# Patient Record
Sex: FEMALE | Race: WHITE | NOT HISPANIC OR LATINO | Employment: FULL TIME | URBAN - METROPOLITAN AREA
[De-identification: names, ages, dates, MRNs, and addresses within clinical notes are randomized per-mention and may not be internally consistent; named-entity substitution may affect disease eponyms.]

---

## 2023-04-25 DIAGNOSIS — I10 HYPERTENSION, UNSPECIFIED TYPE: ICD-10-CM

## 2023-04-25 RX ORDER — LOSARTAN POTASSIUM AND HYDROCHLOROTHIAZIDE 12.5; 5 MG/1; MG/1
TABLET ORAL
Qty: 90 TABLET | Refills: 0 | Status: SHIPPED | OUTPATIENT
Start: 2023-04-25 | End: 2023-08-31 | Stop reason: SDUPTHER

## 2023-06-13 ENCOUNTER — OFFICE VISIT (OUTPATIENT)
Dept: PRIMARY CARE | Facility: CLINIC | Age: 63
End: 2023-06-13
Payer: COMMERCIAL

## 2023-06-13 VITALS
TEMPERATURE: 97.8 F | WEIGHT: 179 LBS | RESPIRATION RATE: 18 BRPM | OXYGEN SATURATION: 98 % | SYSTOLIC BLOOD PRESSURE: 132 MMHG | DIASTOLIC BLOOD PRESSURE: 84 MMHG | HEART RATE: 78 BPM | BODY MASS INDEX: 29.79 KG/M2

## 2023-06-13 DIAGNOSIS — R39.9 URINARY TRACT INFECTION SYMPTOMS: Primary | ICD-10-CM

## 2023-06-13 LAB
POC APPEARANCE, URINE: CLEAR
POC BILIRUBIN, URINE: NEGATIVE
POC BLOOD, URINE: ABNORMAL
POC COLOR, URINE: YELLOW
POC GLUCOSE, URINE: NEGATIVE MG/DL
POC KETONES, URINE: NEGATIVE MG/DL
POC LEUKOCYTES, URINE: NEGATIVE
POC NITRITE,URINE: NEGATIVE
POC PH, URINE: 6.5 PH
POC PROTEIN, URINE: ABNORMAL MG/DL
POC SPECIFIC GRAVITY, URINE: 1.01
POC UROBILINOGEN, URINE: 0.2 EU/DL

## 2023-06-13 PROCEDURE — 87186 SC STD MICRODIL/AGAR DIL: CPT

## 2023-06-13 PROCEDURE — 1036F TOBACCO NON-USER: CPT | Performed by: NURSE PRACTITIONER

## 2023-06-13 PROCEDURE — 87086 URINE CULTURE/COLONY COUNT: CPT

## 2023-06-13 PROCEDURE — 81002 URINALYSIS NONAUTO W/O SCOPE: CPT | Performed by: NURSE PRACTITIONER

## 2023-06-13 PROCEDURE — 99214 OFFICE O/P EST MOD 30 MIN: CPT | Performed by: NURSE PRACTITIONER

## 2023-06-13 PROCEDURE — 87077 CULTURE AEROBIC IDENTIFY: CPT

## 2023-06-13 RX ORDER — NITROFURANTOIN 25; 75 MG/1; MG/1
100 CAPSULE ORAL 2 TIMES DAILY
Qty: 10 CAPSULE | Refills: 0 | Status: SHIPPED | OUTPATIENT
Start: 2023-06-13 | End: 2023-06-18

## 2023-06-13 ASSESSMENT — ENCOUNTER SYMPTOMS
PSYCHIATRIC NEGATIVE: 1
CARDIOVASCULAR NEGATIVE: 1
RESPIRATORY NEGATIVE: 1
GASTROINTESTINAL NEGATIVE: 1
HEMATURIA: 0
FLANK PAIN: 0
CONSTITUTIONAL NEGATIVE: 1
FREQUENCY: 1
NEUROLOGICAL NEGATIVE: 1
DYSURIA: 1

## 2023-06-13 NOTE — PROGRESS NOTES
Subjective   Patient ID: Shilpi Barraza is a 62 y.o. female who presents for UTI.    Symptoms started on Sunday with urinary frequency, burning with urination, urgency, hesitancy. No blood in the urine. No nausea or vomiting. Pt took AZO for the past few days and it has been helping. Feeling well otherwise.     Review of Systems   Constitutional: Negative.    HENT: Negative.     Respiratory: Negative.     Cardiovascular: Negative.    Gastrointestinal: Negative.    Genitourinary:  Positive for dysuria, frequency and urgency. Negative for flank pain and hematuria.   Neurological: Negative.    Psychiatric/Behavioral: Negative.         Objective   /84   Pulse 78   Temp 36.6 °C (97.8 °F) (Temporal)   Resp 18   Wt 81.2 kg (179 lb)   SpO2 98%   BMI 29.79 kg/m²     Physical Exam  Vitals reviewed.   Constitutional:       Appearance: Normal appearance. She is not ill-appearing or toxic-appearing.   HENT:      Head: Atraumatic.   Eyes:      Conjunctiva/sclera: Conjunctivae normal.   Cardiovascular:      Rate and Rhythm: Normal rate and regular rhythm.      Heart sounds: Normal heart sounds. No murmur heard.  Pulmonary:      Effort: Pulmonary effort is normal.      Breath sounds: Normal breath sounds. No wheezing.   Abdominal:      General: Bowel sounds are normal.      Palpations: Abdomen is soft.      Tenderness: There is no right CVA tenderness or left CVA tenderness.      Comments: Slight suprapubic pressure to palpation   Skin:     General: Skin is warm and dry.   Neurological:      General: No focal deficit present.      Mental Status: She is alert.   Psychiatric:         Mood and Affect: Mood normal.         Behavior: Behavior normal.     Assessment/Plan   Problem List Items Addressed This Visit    None  Visit Diagnoses       Urinary tract infection symptoms    -  Primary    Relevant Medications    nitrofurantoin, macrocrystal-monohydrate, (Macrobid) 100 mg capsule    Other Relevant Orders    Urine Culture     POCT UA (nonautomated) manually resulted (Completed)        will start patient on macrobid at this time. will send urine out for culture. patient advised to call the office if symptoms persist in the next 2-3 days despite the use of the medication. patient advised to go to the ER for any fevers, chills, abdominal pain, nausea, vomiting or new/concerning symptoms; she agreed. will call pt when urine culture results become available.

## 2023-06-16 ENCOUNTER — TELEPHONE (OUTPATIENT)
Dept: PRIMARY CARE | Facility: CLINIC | Age: 63
End: 2023-06-16
Payer: COMMERCIAL

## 2023-06-16 DIAGNOSIS — R39.9 URINARY TRACT INFECTION SYMPTOMS: Primary | ICD-10-CM

## 2023-06-16 LAB — URINE CULTURE: ABNORMAL

## 2023-06-16 RX ORDER — AMOXICILLIN AND CLAVULANATE POTASSIUM 875; 125 MG/1; MG/1
875 TABLET, FILM COATED ORAL 2 TIMES DAILY
Qty: 14 TABLET | Refills: 0 | Status: SHIPPED | OUTPATIENT
Start: 2023-06-16 | End: 2023-06-23

## 2023-06-16 NOTE — TELEPHONE ENCOUNTER
Patient says that she is still having UTI symptoms and discomfort. Will switch the macrobid to augmentin. Patient to stop the macrobid and take augmentin instead. Pt advised to follow up in 2-3 days if no better despite the use of the medications.

## 2023-06-16 NOTE — TELEPHONE ENCOUNTER
Spoke to patient and she says that she is still having some UTI symptoms and is feeling nauseated. Advised that I only received preliminary results for the urine culture and I should get the sensitivity report later this afternoon. I will call pt when results become available; she is in agreement     ----- Message from kP Agudelo CMA sent at 6/16/2023  9:14 AM EDT -----  Regarding: FW: UTI apt on June 13  Contact: [phone]    ----- Message -----  From: Shilpi Barraza  Sent: 6/15/2023   5:04 PM EDT  To: [facility] [place] [id number] Clinical Support Staff  Subject: UTI apt on June 13                               I came in for an apt on June 13, Not sure how the test results were but I'm still feeling the burning and I think the medication is making me feel sick.  Do you think I may need another medication or should I give it another day or so.

## 2023-08-31 ENCOUNTER — TELEPHONE (OUTPATIENT)
Dept: PRIMARY CARE | Facility: CLINIC | Age: 63
End: 2023-08-31
Payer: COMMERCIAL

## 2023-08-31 DIAGNOSIS — I10 HYPERTENSION, UNSPECIFIED TYPE: ICD-10-CM

## 2023-08-31 RX ORDER — LOSARTAN POTASSIUM AND HYDROCHLOROTHIAZIDE 12.5; 5 MG/1; MG/1
1 TABLET ORAL DAILY
Qty: 90 TABLET | Refills: 0 | Status: SHIPPED | OUTPATIENT
Start: 2023-08-31 | End: 2023-09-25 | Stop reason: SDUPTHER

## 2023-09-20 PROBLEM — I10 HYPERTENSION: Status: ACTIVE | Noted: 2023-09-20

## 2023-09-20 PROBLEM — N81.10 FEMALE BLADDER PROLAPSE: Status: ACTIVE | Noted: 2023-09-20

## 2023-09-20 PROBLEM — E78.5 HYPERLIPIDEMIA: Status: ACTIVE | Noted: 2023-09-20

## 2023-09-20 PROBLEM — R10.13 DYSPEPSIA: Status: ACTIVE | Noted: 2023-09-20

## 2023-09-20 PROBLEM — M16.9 DEGENERATIVE JOINT DISEASE (DJD) OF HIP: Status: ACTIVE | Noted: 2023-09-20

## 2023-09-20 PROBLEM — R19.5 POSITIVE COLORECTAL CANCER SCREENING USING COLOGUARD TEST: Status: ACTIVE | Noted: 2023-09-20

## 2023-09-20 PROBLEM — M16.11 ARTHRITIS OF RIGHT HIP: Status: ACTIVE | Noted: 2023-09-20

## 2023-09-20 PROBLEM — E79.0 ELEVATED URIC ACID IN BLOOD: Status: ACTIVE | Noted: 2023-09-20

## 2023-09-20 PROBLEM — M25.561 KNEE PAIN, RIGHT: Status: ACTIVE | Noted: 2023-09-20

## 2023-09-20 PROBLEM — R73.9 HYPERGLYCEMIA: Status: ACTIVE | Noted: 2023-09-20

## 2023-09-20 PROBLEM — B00.1 HERPES LABIALIS: Status: ACTIVE | Noted: 2023-09-20

## 2023-09-20 PROBLEM — R19.8: Status: ACTIVE | Noted: 2023-09-20

## 2023-09-20 PROBLEM — M62.89 PELVIC FLOOR DYSFUNCTION IN FEMALE: Status: ACTIVE | Noted: 2023-09-20

## 2023-09-20 RX ORDER — ATORVASTATIN CALCIUM 40 MG/1
1 TABLET, FILM COATED ORAL NIGHTLY
COMMUNITY
Start: 2018-12-01 | End: 2023-09-25 | Stop reason: SDUPTHER

## 2023-09-20 RX ORDER — VALACYCLOVIR HYDROCHLORIDE 1 G/1
TABLET, FILM COATED ORAL 2 TIMES DAILY
COMMUNITY
Start: 2016-10-27 | End: 2023-09-25 | Stop reason: SDUPTHER

## 2023-09-20 RX ORDER — OMEPRAZOLE 40 MG/1
1 CAPSULE, DELAYED RELEASE ORAL DAILY
COMMUNITY
Start: 2022-05-09 | End: 2023-09-25 | Stop reason: SDUPTHER

## 2023-09-25 ENCOUNTER — OFFICE VISIT (OUTPATIENT)
Dept: PRIMARY CARE | Facility: CLINIC | Age: 63
End: 2023-09-25
Payer: COMMERCIAL

## 2023-09-25 VITALS
DIASTOLIC BLOOD PRESSURE: 80 MMHG | SYSTOLIC BLOOD PRESSURE: 118 MMHG | HEART RATE: 83 BPM | WEIGHT: 179 LBS | HEIGHT: 65 IN | BODY MASS INDEX: 29.82 KG/M2 | OXYGEN SATURATION: 98 % | RESPIRATION RATE: 18 BRPM | TEMPERATURE: 97.1 F

## 2023-09-25 DIAGNOSIS — R10.13 DYSPEPSIA: Primary | ICD-10-CM

## 2023-09-25 DIAGNOSIS — B00.1 COLD SORE: ICD-10-CM

## 2023-09-25 DIAGNOSIS — I10 HYPERTENSION, UNSPECIFIED TYPE: ICD-10-CM

## 2023-09-25 DIAGNOSIS — E78.5 HYPERLIPIDEMIA, UNSPECIFIED HYPERLIPIDEMIA TYPE: ICD-10-CM

## 2023-09-25 DIAGNOSIS — K21.9 GASTROESOPHAGEAL REFLUX DISEASE, UNSPECIFIED WHETHER ESOPHAGITIS PRESENT: ICD-10-CM

## 2023-09-25 DIAGNOSIS — R73.03 PREDIABETES: ICD-10-CM

## 2023-09-25 PROCEDURE — 1036F TOBACCO NON-USER: CPT | Performed by: PHYSICIAN ASSISTANT

## 2023-09-25 PROCEDURE — 99214 OFFICE O/P EST MOD 30 MIN: CPT | Performed by: PHYSICIAN ASSISTANT

## 2023-09-25 PROCEDURE — 3079F DIAST BP 80-89 MM HG: CPT | Performed by: PHYSICIAN ASSISTANT

## 2023-09-25 PROCEDURE — 3074F SYST BP LT 130 MM HG: CPT | Performed by: PHYSICIAN ASSISTANT

## 2023-09-25 RX ORDER — LOSARTAN POTASSIUM AND HYDROCHLOROTHIAZIDE 12.5; 5 MG/1; MG/1
1 TABLET ORAL DAILY
Qty: 90 TABLET | Refills: 1 | Status: SHIPPED | OUTPATIENT
Start: 2023-09-25 | End: 2023-09-25

## 2023-09-25 RX ORDER — OMEPRAZOLE 40 MG/1
40 CAPSULE, DELAYED RELEASE ORAL DAILY
Qty: 90 CAPSULE | Refills: 1 | Status: SHIPPED | OUTPATIENT
Start: 2023-09-25 | End: 2023-09-25

## 2023-09-25 RX ORDER — ATORVASTATIN CALCIUM 40 MG/1
40 TABLET, FILM COATED ORAL NIGHTLY
Qty: 90 TABLET | Refills: 1 | Status: SHIPPED | OUTPATIENT
Start: 2023-09-25 | End: 2023-09-25

## 2023-09-25 RX ORDER — VALACYCLOVIR HYDROCHLORIDE 1 G/1
1000 TABLET, FILM COATED ORAL DAILY
Qty: 90 TABLET | Refills: 1 | Status: SHIPPED | OUTPATIENT
Start: 2023-09-25 | End: 2023-09-25

## 2023-09-25 NOTE — ASSESSMENT & PLAN NOTE
- Last lipid 2/16/23 , ratio 5.4  - Pt is currently on atorvastatin 40 mg   - Diet is poor right now - encouraged she improve this - decrease saturated fats  - New fasting lipid panel ordered today

## 2023-09-25 NOTE — PROGRESS NOTES
"Subjective   Patient ID: Shilpi Barraza is a 63 y.o. female who presents for Med Refill (Dr Zacarias pt here today for med refills; last seen 2/2023. ).    HPI     Hyperlipidemia:  - Last lipid 2/16/23 , ratio 5.4  - Pt is currently on atorvastatin 40 mg   - Diet: not eating as healthy lately (mom going through tx for mouth cancer and so pt has been working full time and then checking on mom - eating on the run)     Prediabetes  - Labs 2/16/23 showed A1c 5.7%  - Not on any meds for this   - Low sweets/ low carb diet? Not right now     HTN   - Well controleld on losartan-hydrochlorothiazide 50-12.5 mg     GERD  - Takes omeprazole 40 mg about twice a week      Objective   /80   Pulse 83   Temp 36.2 °C (97.1 °F)   Resp 18   Ht 1.651 m (5' 5\")   Wt 81.2 kg (179 lb)   SpO2 98%   BMI 29.79 kg/m²     Physical Exam  Constitutional:       Appearance: Normal appearance.   Cardiovascular:      Rate and Rhythm: Normal rate and regular rhythm.      Pulses: Normal pulses.      Heart sounds: Normal heart sounds. No murmur heard.  Pulmonary:      Effort: Pulmonary effort is normal.      Breath sounds: Normal breath sounds.   Neurological:      Mental Status: She is alert.   Psychiatric:         Mood and Affect: Mood and affect normal.         Assessment/Plan   Problem List Items Addressed This Visit       Dyspepsia - Primary    Relevant Medications    omeprazole (PriLOSEC) 40 mg DR capsule    Hyperlipidemia     - Last lipid 2/16/23 , ratio 5.4  - Pt is currently on atorvastatin 40 mg   - Diet is poor right now - encouraged she improve this - decrease saturated fats  - New fasting lipid panel ordered today          Relevant Medications    atorvastatin (Lipitor) 40 mg tablet    Other Relevant Orders    Magnesium    Lipid Panel    Hypertension     - BP well controlled on current regimen losartan-hydrochlorothiazide 50-12.5 mg   - BP today 118/80 mmHg   - Continue current tx plan         Relevant " Medications    losartan-hydrochlorothiazide (Hyzaar) 50-12.5 mg tablet    Other Relevant Orders    Magnesium    Vitamin B12    Basic Metabolic Panel    Prediabetes     - Most recent A1c was 5.7%   - Encouraged low sweets/ low carb diet          Relevant Orders    Hemoglobin A1C     Other Visit Diagnoses       Gastroesophageal reflux disease, unspecified whether esophagitis present        Relevant Medications    omeprazole (PriLOSEC) 40 mg DR capsule    Cold sore        Relevant Medications    valACYclovir (Valtrex) 1 gram tablet

## 2023-09-25 NOTE — ASSESSMENT & PLAN NOTE
- BP well controlled on current regimen losartan-hydrochlorothiazide 50-12.5 mg   - BP today 118/80 mmHg   - Continue current tx plan

## 2023-12-18 PROCEDURE — RXMED WILLOW AMBULATORY MEDICATION CHARGE

## 2023-12-21 ENCOUNTER — PHARMACY VISIT (OUTPATIENT)
Dept: PHARMACY | Facility: CLINIC | Age: 63
End: 2023-12-21
Payer: COMMERCIAL

## 2023-12-27 ENCOUNTER — CLINICAL SUPPORT (OUTPATIENT)
Dept: ORTHOPEDIC SURGERY | Facility: CLINIC | Age: 63
End: 2023-12-27
Payer: COMMERCIAL

## 2023-12-27 ENCOUNTER — OFFICE VISIT (OUTPATIENT)
Dept: ORTHOPEDIC SURGERY | Facility: CLINIC | Age: 63
End: 2023-12-27
Payer: COMMERCIAL

## 2023-12-27 DIAGNOSIS — M16.11 ARTHRITIS OF RIGHT HIP: ICD-10-CM

## 2023-12-27 PROCEDURE — 1036F TOBACCO NON-USER: CPT | Performed by: FAMILY MEDICINE

## 2023-12-27 PROCEDURE — 20611 DRAIN/INJ JOINT/BURSA W/US: CPT | Performed by: FAMILY MEDICINE

## 2023-12-27 PROCEDURE — 99213 OFFICE O/P EST LOW 20 MIN: CPT | Performed by: FAMILY MEDICINE

## 2023-12-27 RX ADMIN — LIDOCAINE HYDROCHLORIDE 2 ML: 10 INJECTION, SOLUTION EPIDURAL; INFILTRATION; INTRACAUDAL; PERINEURAL at 15:03

## 2023-12-27 RX ADMIN — METHYLPREDNISOLONE ACETATE 80 MG: 40 INJECTION, SUSPENSION INTRA-ARTICULAR; INTRALESIONAL; INTRAMUSCULAR; SOFT TISSUE at 15:03

## 2023-12-27 ASSESSMENT — PAIN DESCRIPTION - DESCRIPTORS: DESCRIPTORS: SHARP

## 2023-12-27 ASSESSMENT — PAIN - FUNCTIONAL ASSESSMENT: PAIN_FUNCTIONAL_ASSESSMENT: 0-10

## 2023-12-27 ASSESSMENT — PAIN SCALES - GENERAL: PAINLEVEL_OUTOF10: 4

## 2023-12-27 NOTE — PROGRESS NOTES
FUV R hip CSI(4/17/23-CSI)  Chief Complaint: Pain of the Right Hip  History of Present Illness:  Encounter date: 12/27/2023  Shilpi Barraza is a 63 y.o. female presents with bilateral hip pain, right worse than left since December 2021. She states she initially fell on some stairs in November 2021 and then slipped getting out of the bathtub about a month later. When asked the point where the pain is, she points to the groin. She is having trouble going from sitting to standing. Walking is a little bit difficult as well. Nothing really helps the pain and she rates it as a 6 out of 10 at its worst. 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 She denies any radiating type pain.     On 4/17/23, she returned saying that the R hip CSI worked for about 5-6mo but she is starting to get pain again. She denies new trauma or injuries. She is requesting a repeat injection. No other complaints here today.     12/27/23  She returns today with recurrence of her right hip pain.  Her corticosteroid injection lasted for about 4 months but she is now having pain in the anterior groin.  She denies any new injury or trauma to the area.  She is requesting repeat injection.    Problem List  Patient Active Problem List    Diagnosis Date Noted    Prediabetes 09/25/2023    Arthritis of right hip 09/20/2023    Degenerative joint disease (DJD) of hip 09/20/2023    Dyspepsia 09/20/2023    Elevated uric acid in blood 09/20/2023    Herpes labialis 09/20/2023    Hyperglycemia 09/20/2023    Hyperlipidemia 09/20/2023    Hypertension 09/20/2023    Knee pain, right 09/20/2023    Nonspecific hepatocellular changes 09/20/2023    Pelvic floor dysfunction in female 09/20/2023    Positive colorectal cancer screening using Cologuard test 09/20/2023    Female bladder prolapse 09/20/2023       Past Medical History  Past Medical History:   Diagnosis Date    Encounter for  gynecological examination (general) (routine) without abnormal findings     Pap test, as part of routine gynecological examination    Other conditions influencing health status     Mammogram normal       Past Surgical History  Past Surgical History:   Procedure Laterality Date    COLONOSCOPY      Colonoscopy    DILATION AND CURETTAGE OF UTERUS  04/13/2015    Dilation And Curettage    GALLBLADDER SURGERY  04/14/2015    Gallbladder Surgery    HYSTEROSCOPY  04/13/2015    Hysteroscopy With Endometrial Ablation    MOUTH SURGERY      Oral Surgery       Social History  Social History     Socioeconomic History    Marital status:      Spouse name: Not on file    Number of children: Not on file    Years of education: Not on file    Highest education level: Not on file   Occupational History    Not on file   Tobacco Use    Smoking status: Never    Smokeless tobacco: Never   Substance and Sexual Activity    Alcohol use: Never    Drug use: Never    Sexual activity: Not on file   Other Topics Concern    Not on file   Social History Narrative    Not on file     Social Determinants of Health     Financial Resource Strain: Not on file   Food Insecurity: Not on file   Transportation Needs: Not on file   Physical Activity: Not on file   Stress: Not on file   Social Connections: Not on file   Intimate Partner Violence: Not on file   Housing Stability: Not on file       Allergies  No Known Allergies    Medications  Current Outpatient Medications   Medication Sig Dispense Refill    atorvastatin (Lipitor) 40 mg tablet TAKE 1 TABLET BY MOUTH AT BEDTIME 90 tablet 1    losartan-hydrochlorothiazide (Hyzaar) 50-12.5 mg tablet TAKE 1 TABLET BY MOUTH ONCE DAILY AS DIRECTED 90 tablet 1    omeprazole (PriLOSEC) 40 mg DR capsule TAKE 1 CAPSULE BY MOUTH ONCE DAILY 90 capsule 1    valACYclovir (Valtrex) 1 gram tablet TAKE 1 TABLET BY MOUTH ONCE DAILY 90 tablet 1     No current facility-administered medications for this visit.       Physical  Exam:  The right hip and pelvis are without obvious signs of acute bony deformity, swelling, erythema, ecchymosis or instability. Active and passive range of motion are limited and painful. Log roll is positive. Straight leg raise test is negative.  FADIR is positive. ANTONIA is negative.  Crossover is negative. Hip strength is weak as compared to the opposite hip. The opposite hip is otherwise nontender and stable.    Imaging:  Right hip xrays show moderate to severe arthritis of the right hip with osteophyte formation.    The studies were reviewed with Dr. Montelongo personally in the office today.    Ultrasound-guided right femoral acetabular joint corticosteroid injection. Risk, benefits, and alternatives were explained to the patient, verbal and written consent was obtained. The was placed in a supine position. The right hip was identified. The curved ultrasound transducer was placed over the hip joint. The femoral vessels were identified, the probe was slid laterally and turned on its long axis to reveal the hip joint and capsular recess. The area of injection was cleaned with a chlorhexidine prep and was draped sterilely. Next, a 20-gauge spinal needle was then placed into the joint recess under ultrasound guidance. The needle tip was visualized throughout. 2 mL of Depo-Medrol and 2 mL of 1% lidocaine was injected into the joint space. Distention of the joint space was visualized on ultrasound. The entire contents of the syringe was injected, the fluid flowed freely without obstruction. The needle was then removed, the areas cleaned with an alcohol prep, and a sterile Band-Aid was placed. Ultrasound images were obtained throughout.    L Inj/Asp: R hip joint on 12/27/2023 3:03 PM  Indications: pain  Details: 20 G needle, ultrasound-guided anterior approach  Medications: 2 mL lidocaine PF 10 mg/mL (1 %); 80 mg methylPREDNISolone acetate 40 mg/mL  Outcome: tolerated well, no immediate complications  Procedure,  treatment alternatives, risks and benefits explained, specific risks discussed. Consent was given by the patient. Patient was prepped and draped in the usual sterile fashion.             Problem List Items Addressed This Visit             ICD-10-CM    Arthritis of right hip M16.11    Relevant Orders    XR hip right with pelvis when performed 2 or 3 views    Point of Care Ultrasound (Completed)     Patient Discussion/Summary  Bilateral hip osteoarthritis, right worse than left status post ultrasound-guided right hip intra-articular hip injection. She was educated on the signs and symptoms of local reaction and infection and should call the office if she experiences any of these. She should take it easy on the hips for the next 24 to 48 hours, rest, ice, and anti-inflammatories. After that, she can return to her normal activity. She understands that this is not a cure, but an attempt to buy some time, decrease her discomfort, and slow the arthritic process. She may ultimately require surgery, but we will exhaust all of our conservative treatment options prior to that. She will follow-up as needed. All of her questions were answered and she agrees with the treatment plan.     Reinier Saeed, DO  Primary Care Sports Medicine Fellow    ** Please excuse any errors in grammar or translation related to this dictation. Voice recognition software was utilized to prepare this document. **

## 2023-12-31 RX ORDER — METHYLPREDNISOLONE ACETATE 40 MG/ML
80 INJECTION, SUSPENSION INTRA-ARTICULAR; INTRALESIONAL; INTRAMUSCULAR; SOFT TISSUE
Status: COMPLETED | OUTPATIENT
Start: 2023-12-27 | End: 2023-12-27

## 2023-12-31 RX ORDER — LIDOCAINE HYDROCHLORIDE 10 MG/ML
2 INJECTION, SOLUTION EPIDURAL; INFILTRATION; INTRACAUDAL; PERINEURAL
Status: COMPLETED | OUTPATIENT
Start: 2023-12-27 | End: 2023-12-27

## 2024-02-12 ENCOUNTER — APPOINTMENT (OUTPATIENT)
Dept: PHYSICAL THERAPY | Facility: CLINIC | Age: 64
End: 2024-02-12
Payer: COMMERCIAL

## 2024-03-13 DIAGNOSIS — R10.13 DYSPEPSIA: ICD-10-CM

## 2024-03-13 DIAGNOSIS — B00.1 COLD SORE: ICD-10-CM

## 2024-03-13 DIAGNOSIS — I10 HYPERTENSION, UNSPECIFIED TYPE: ICD-10-CM

## 2024-03-13 DIAGNOSIS — E78.5 HYPERLIPIDEMIA, UNSPECIFIED HYPERLIPIDEMIA TYPE: ICD-10-CM

## 2024-03-13 DIAGNOSIS — K21.9 GASTROESOPHAGEAL REFLUX DISEASE, UNSPECIFIED WHETHER ESOPHAGITIS PRESENT: ICD-10-CM

## 2024-03-13 PROCEDURE — RXMED WILLOW AMBULATORY MEDICATION CHARGE

## 2024-03-13 RX ORDER — OMEPRAZOLE 40 MG/1
40 CAPSULE, DELAYED RELEASE ORAL DAILY
Qty: 90 CAPSULE | Refills: 1 | Status: SHIPPED | OUTPATIENT
Start: 2024-03-13 | End: 2025-03-13

## 2024-03-13 RX ORDER — ATORVASTATIN CALCIUM 40 MG/1
40 TABLET, FILM COATED ORAL NIGHTLY
Qty: 90 TABLET | Refills: 1 | Status: SHIPPED | OUTPATIENT
Start: 2024-03-13 | End: 2024-03-19 | Stop reason: SINTOL

## 2024-03-13 RX ORDER — VALACYCLOVIR HYDROCHLORIDE 1 G/1
1000 TABLET, FILM COATED ORAL DAILY
Qty: 90 TABLET | Refills: 1 | Status: SHIPPED | OUTPATIENT
Start: 2024-03-13 | End: 2025-03-13

## 2024-03-13 RX ORDER — LOSARTAN POTASSIUM AND HYDROCHLOROTHIAZIDE 12.5; 5 MG/1; MG/1
1 TABLET ORAL DAILY
Qty: 90 TABLET | Refills: 1 | Status: SHIPPED | OUTPATIENT
Start: 2024-03-13 | End: 2025-03-13

## 2024-03-19 ENCOUNTER — PHARMACY VISIT (OUTPATIENT)
Dept: PHARMACY | Facility: CLINIC | Age: 64
End: 2024-03-19
Payer: COMMERCIAL

## 2024-03-19 ENCOUNTER — OFFICE VISIT (OUTPATIENT)
Dept: PRIMARY CARE | Facility: CLINIC | Age: 64
End: 2024-03-19
Payer: COMMERCIAL

## 2024-03-19 ENCOUNTER — LAB (OUTPATIENT)
Dept: LAB | Facility: LAB | Age: 64
End: 2024-03-19
Payer: COMMERCIAL

## 2024-03-19 VITALS
WEIGHT: 180 LBS | TEMPERATURE: 96.9 F | OXYGEN SATURATION: 100 % | RESPIRATION RATE: 18 BRPM | BODY MASS INDEX: 29.95 KG/M2 | SYSTOLIC BLOOD PRESSURE: 122 MMHG | HEART RATE: 57 BPM | DIASTOLIC BLOOD PRESSURE: 86 MMHG

## 2024-03-19 DIAGNOSIS — Z23 NEED FOR TDAP VACCINATION: Primary | ICD-10-CM

## 2024-03-19 DIAGNOSIS — I10 HYPERTENSION, UNSPECIFIED TYPE: ICD-10-CM

## 2024-03-19 DIAGNOSIS — E78.5 HYPERLIPIDEMIA, UNSPECIFIED HYPERLIPIDEMIA TYPE: ICD-10-CM

## 2024-03-19 DIAGNOSIS — R73.03 PREDIABETES: ICD-10-CM

## 2024-03-19 DIAGNOSIS — Z12.31 ENCOUNTER FOR SCREENING MAMMOGRAM FOR BREAST CANCER: ICD-10-CM

## 2024-03-19 DIAGNOSIS — Z00.00 ANNUAL PHYSICAL EXAM: ICD-10-CM

## 2024-03-19 LAB
ANION GAP SERPL CALC-SCNC: 11 MMOL/L (ref 10–20)
BUN SERPL-MCNC: 16 MG/DL (ref 6–23)
CALCIUM SERPL-MCNC: 9.6 MG/DL (ref 8.6–10.3)
CHLORIDE SERPL-SCNC: 103 MMOL/L (ref 98–107)
CHOLEST SERPL-MCNC: 238 MG/DL (ref 0–199)
CHOLESTEROL/HDL RATIO: 6.6
CO2 SERPL-SCNC: 31 MMOL/L (ref 21–32)
CREAT SERPL-MCNC: 0.87 MG/DL (ref 0.5–1.05)
EGFRCR SERPLBLD CKD-EPI 2021: 75 ML/MIN/1.73M*2
EST. AVERAGE GLUCOSE BLD GHB EST-MCNC: 114 MG/DL
GLUCOSE SERPL-MCNC: 108 MG/DL (ref 74–99)
HBA1C MFR BLD: 5.6 %
HDLC SERPL-MCNC: 36 MG/DL
LDLC SERPL CALC-MCNC: ABNORMAL MG/DL
MAGNESIUM SERPL-MCNC: 1.83 MG/DL (ref 1.6–2.4)
NON HDL CHOLESTEROL: 202 MG/DL (ref 0–149)
POTASSIUM SERPL-SCNC: 4.2 MMOL/L (ref 3.5–5.3)
SODIUM SERPL-SCNC: 141 MMOL/L (ref 136–145)
TRIGL SERPL-MCNC: 516 MG/DL (ref 0–149)
VIT B12 SERPL-MCNC: 2090 PG/ML (ref 211–911)
VLDL: ABNORMAL

## 2024-03-19 PROCEDURE — 90471 IMMUNIZATION ADMIN: CPT | Performed by: PHYSICIAN ASSISTANT

## 2024-03-19 PROCEDURE — 83036 HEMOGLOBIN GLYCOSYLATED A1C: CPT

## 2024-03-19 PROCEDURE — 36415 COLL VENOUS BLD VENIPUNCTURE: CPT

## 2024-03-19 PROCEDURE — 3074F SYST BP LT 130 MM HG: CPT | Performed by: PHYSICIAN ASSISTANT

## 2024-03-19 PROCEDURE — 90715 TDAP VACCINE 7 YRS/> IM: CPT | Performed by: PHYSICIAN ASSISTANT

## 2024-03-19 PROCEDURE — 80048 BASIC METABOLIC PNL TOTAL CA: CPT

## 2024-03-19 PROCEDURE — 80061 LIPID PANEL: CPT

## 2024-03-19 PROCEDURE — 82607 VITAMIN B-12: CPT

## 2024-03-19 PROCEDURE — 99396 PREV VISIT EST AGE 40-64: CPT | Performed by: PHYSICIAN ASSISTANT

## 2024-03-19 PROCEDURE — RXMED WILLOW AMBULATORY MEDICATION CHARGE

## 2024-03-19 PROCEDURE — 1036F TOBACCO NON-USER: CPT | Performed by: PHYSICIAN ASSISTANT

## 2024-03-19 PROCEDURE — 3079F DIAST BP 80-89 MM HG: CPT | Performed by: PHYSICIAN ASSISTANT

## 2024-03-19 PROCEDURE — 83735 ASSAY OF MAGNESIUM: CPT

## 2024-03-19 RX ORDER — ROSUVASTATIN CALCIUM 5 MG/1
TABLET, COATED ORAL
Qty: 90 TABLET | Refills: 1 | Status: SHIPPED | OUTPATIENT
Start: 2024-03-19

## 2024-03-19 ASSESSMENT — ENCOUNTER SYMPTOMS: ABDOMINAL PAIN: 1

## 2024-03-19 NOTE — ASSESSMENT & PLAN NOTE
- Due for updated labs - ordered end of last year - encouraged she get these done soon   - Had to stop the atorvastatin due to myalgia. Will try transitioning to low dose Crestor   - Still encourage low fat diet and healthy lifestyle.

## 2024-03-19 NOTE — ASSESSMENT & PLAN NOTE
PREVENTIVE CARE SCREENING:  - Labs: DUE, ordered last visit, advised she get these done before they  in September   - Cologuard/ Colonoscopy: UTD   Vaccines:   - Flu: UTD   - Shingles Vaccine (start at 50): UTD  - Tetanus (q10yrs): Updated today   - COVID-19: UTD  Women's health:  - PAP: will be DUE in April of this year   - Mammogram: DUE, ordered today   Lifestyle Modification:  - Discussed DIET -   limit snacks, processed foods, sugary and greasy foods, fast foods. Increase healthy alternatives, whole grains, fruits vegetables.  - Encouraged to take daily multivitamin.    - Discussed EXERCISE -   Recommended weight training for bone health and 30 minutes of cardiovascular exercise 5-7 days a week.  - Encouraged pt to get yearly eye and dental exams

## 2024-03-19 NOTE — PROGRESS NOTES
Subjective   Patient ID: Shilpi Barraza is a 63 y.o. female who presents for Annual Exam (Pt here today for a check up and wants discuss her stomach pain mid all from side to side; feels like she has diverticulitis-bothers her if she eats corn past 3-4 months. ).    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 Montelongo)   - Sexual hx: active with , no concerns   - Tobacco: never   - Alcohol: none   - Mood: anxious more lately - coping ok with it   - Dentist visits: utd   - Eye exams: due      Abd pain:  - Periodically getting bad stomach aches. When she eats will have to have a BM but not diarrhea. Will be very painful and will have to take some ibuprofen. - Wondering if it's diverticulitis? Hurts after eating corn and bananas.     HLD:   Having muscle cramps with her Lipitor so stopped it a couple months ago    Review of Systems   Gastrointestinal:  Positive for abdominal pain.       Objective   /86   Pulse 57   Temp 36.1 °C (96.9 °F)   Resp 18   Wt 81.6 kg (180 lb)   SpO2 100%   BMI 29.95 kg/m²     Physical Exam  Constitutional:       General: She is not in acute distress.     Appearance: Normal appearance.   HENT:      Head: Normocephalic.      Right Ear: Tympanic membrane and ear canal normal.      Left Ear: Tympanic membrane and ear canal normal.      Nose: Nose normal.      Mouth/Throat:      Mouth: Mucous membranes are moist.      Pharynx: Oropharynx is clear.   Eyes:      Extraocular Movements: Extraocular movements intact.      Conjunctiva/sclera: Conjunctivae normal.      Pupils: Pupils are equal, round, and reactive to light.   Cardiovascular:      Rate and Rhythm: Normal rate and  regular rhythm.      Pulses: Normal pulses.      Heart sounds: Normal heart sounds. No murmur heard.  Pulmonary:      Effort: Pulmonary effort is normal.      Breath sounds: Normal breath sounds. No wheezing, rhonchi or rales.   Abdominal:      General: Bowel sounds are normal. There is no distension.      Palpations: Abdomen is soft. There is no mass.      Tenderness: There is no abdominal tenderness. There is no guarding.   Musculoskeletal:         General: Normal range of motion.      Cervical back: Neck supple.   Lymphadenopathy:      Cervical: No cervical adenopathy.   Skin:     General: Skin is warm and dry.      Findings: No lesion or rash.   Neurological:      General: No focal deficit present.      Mental Status: She is alert.      Gait: Gait normal.   Psychiatric:         Mood and Affect: Mood and affect normal.         Behavior: Behavior normal.         Thought Content: Thought content normal.         Judgment: Judgment normal.         Assessment/Plan     Problem List Items Addressed This Visit       Hyperlipidemia    Overview     - Most recent labs: total chol 215, , HDL 39.5, ratio 5.4, trigs 293 (23)   - Historical med: atorvastatin 40 mg (muscle cramps)          Current Assessment & Plan     - Due for updated labs - ordered end of last year - encouraged she get these done soon   - Had to stop the atorvastatin due to myalgia. Will try transitioning to low dose Crestor   - Still encourage low fat diet and healthy lifestyle.          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 Increase healthy alternatives, whole grains, fruits vegetables.  - Encouraged to take daily multivitamin.    - Discussed EXERCISE -   Recommended weight training for bone health and 30 minutes of cardiovascular exercise 5-7 days a week.  - Encouraged pt to get yearly eye and dental exams           Other Visit Diagnoses       Need for Tdap vaccination    -  Primary    Relevant Orders    Tdap vaccine, age 7 years and older (Completed)    Encounter for screening mammogram for breast cancer        Relevant Orders    BI mammo bilateral screening tomosynthesis             <<----- Click to add NO pertinent Past Medical History No pertinent past medical history

## 2024-03-22 ENCOUNTER — PHARMACY VISIT (OUTPATIENT)
Dept: PHARMACY | Facility: CLINIC | Age: 64
End: 2024-03-22
Payer: COMMERCIAL

## 2024-04-01 ENCOUNTER — APPOINTMENT (OUTPATIENT)
Dept: RADIOLOGY | Facility: CLINIC | Age: 64
End: 2024-04-01
Payer: COMMERCIAL

## 2024-04-02 ENCOUNTER — APPOINTMENT (OUTPATIENT)
Dept: RADIOLOGY | Facility: CLINIC | Age: 64
End: 2024-04-02
Payer: COMMERCIAL

## 2024-04-11 ENCOUNTER — APPOINTMENT (OUTPATIENT)
Dept: PHYSICAL THERAPY | Facility: CLINIC | Age: 64
End: 2024-04-11
Payer: COMMERCIAL

## 2024-04-15 ENCOUNTER — HOSPITAL ENCOUNTER (OUTPATIENT)
Dept: RADIOLOGY | Facility: CLINIC | Age: 64
Discharge: HOME | End: 2024-04-15
Payer: COMMERCIAL

## 2024-04-15 VITALS — WEIGHT: 173 LBS | HEIGHT: 64 IN | BODY MASS INDEX: 29.53 KG/M2

## 2024-04-15 DIAGNOSIS — Z12.31 ENCOUNTER FOR SCREENING MAMMOGRAM FOR BREAST CANCER: ICD-10-CM

## 2024-04-15 PROCEDURE — 77067 SCR MAMMO BI INCL CAD: CPT

## 2024-04-15 PROCEDURE — 77063 BREAST TOMOSYNTHESIS BI: CPT | Performed by: RADIOLOGY

## 2024-04-15 PROCEDURE — 77067 SCR MAMMO BI INCL CAD: CPT | Performed by: RADIOLOGY

## 2024-04-16 ENCOUNTER — APPOINTMENT (OUTPATIENT)
Dept: PHYSICAL THERAPY | Facility: CLINIC | Age: 64
End: 2024-04-16
Payer: COMMERCIAL

## 2024-04-19 ENCOUNTER — APPOINTMENT (OUTPATIENT)
Dept: PHYSICAL THERAPY | Facility: CLINIC | Age: 64
End: 2024-04-19
Payer: COMMERCIAL

## 2024-04-24 ENCOUNTER — HOSPITAL ENCOUNTER (OUTPATIENT)
Dept: RADIOLOGY | Facility: EXTERNAL LOCATION | Age: 64
Discharge: HOME | End: 2024-04-24

## 2024-04-24 ENCOUNTER — OFFICE VISIT (OUTPATIENT)
Dept: ORTHOPEDIC SURGERY | Facility: CLINIC | Age: 64
End: 2024-04-24
Payer: COMMERCIAL

## 2024-04-24 DIAGNOSIS — M16.11 ARTHRITIS OF RIGHT HIP: ICD-10-CM

## 2024-04-24 PROCEDURE — 99213 OFFICE O/P EST LOW 20 MIN: CPT | Performed by: FAMILY MEDICINE

## 2024-04-24 PROCEDURE — 1036F TOBACCO NON-USER: CPT | Performed by: FAMILY MEDICINE

## 2024-04-24 PROCEDURE — 20611 DRAIN/INJ JOINT/BURSA W/US: CPT | Performed by: FAMILY MEDICINE

## 2024-04-24 RX ORDER — METHYLPREDNISOLONE ACETATE 40 MG/ML
80 INJECTION, SUSPENSION INTRA-ARTICULAR; INTRALESIONAL; INTRAMUSCULAR; SOFT TISSUE
Status: COMPLETED | OUTPATIENT
Start: 2024-04-24 | End: 2024-04-24

## 2024-04-24 RX ORDER — LIDOCAINE HYDROCHLORIDE 10 MG/ML
2 INJECTION, SOLUTION EPIDURAL; INFILTRATION; INTRACAUDAL; PERINEURAL
Status: COMPLETED | OUTPATIENT
Start: 2024-04-24 | End: 2024-04-24

## 2024-04-24 RX ADMIN — LIDOCAINE HYDROCHLORIDE 2 ML: 10 INJECTION, SOLUTION EPIDURAL; INFILTRATION; INTRACAUDAL; PERINEURAL at 09:50

## 2024-04-24 RX ADMIN — METHYLPREDNISOLONE ACETATE 80 MG: 40 INJECTION, SUSPENSION INTRA-ARTICULAR; INTRALESIONAL; INTRAMUSCULAR; SOFT TISSUE at 09:50

## 2024-04-24 ASSESSMENT — PAIN - FUNCTIONAL ASSESSMENT: PAIN_FUNCTIONAL_ASSESSMENT: 0-10

## 2024-04-24 ASSESSMENT — PAIN SCALES - GENERAL: PAINLEVEL_OUTOF10: 8

## 2024-04-24 ASSESSMENT — PAIN DESCRIPTION - DESCRIPTORS: DESCRIPTORS: ACHING;STABBING

## 2024-04-24 NOTE — PROGRESS NOTES
FUV R hip CSI(4/17/23-CSI)  Chief Complaint: Pain of the Right Hip and Injections (CSI)  History of Present Illness:  Encounter date: 04/24/2024  Shilpi Barraza is a 63 y.o. female presents with bilateral hip pain, right worse than left since December 2021. She states she initially fell on some stairs in November 2021 and then slipped getting out of the bathtub about a month later. When asked the point where the pain is, she points to the groin. She is having trouble going from sitting to standing. Walking is a little bit difficult as well. Nothing really helps the pain and she rates it as a 6 out of 10 at its worst. 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 She denies any radiating type pain.     On 4/17/23, she returned saying that the R hip CSI worked for about 5-6mo but she is starting to get pain again. She denies new trauma or injuries. She is requesting a repeat injection. No other complaints here today.     12/27/23  She returns today with recurrence of her right hip pain.  Her corticosteroid injection lasted for about 4 months but she is now having pain in the anterior groin.  She denies any new injury or trauma to the area.  She is requesting repeat injection.    4/24/24  She returns today for follow up of chronic right hip pain. The last CSI injection lasted about 3.5 months. She has started to develop pain with walking. She is interested in talking to a joint replacement specialist but would like to do a repeat CSI injection today. She denies any new injury or trauma.    Problem List  Patient Active Problem List    Diagnosis Date Noted    Annual physical exam 03/19/2024    Prediabetes 09/25/2023    Arthritis of right hip 09/20/2023    Degenerative joint disease (DJD) of hip 09/20/2023    Dyspepsia 09/20/2023    Elevated uric acid in blood 09/20/2023    Herpes labialis 09/20/2023    Hyperglycemia 09/20/2023     Hyperlipidemia 09/20/2023    Hypertension 09/20/2023    Knee pain, right 09/20/2023    Nonspecific hepatocellular changes 09/20/2023    Pelvic floor dysfunction in female 09/20/2023    Positive colorectal cancer screening using Cologuard test 09/20/2023    Female bladder prolapse 09/20/2023       Past Medical History  Past Medical History:   Diagnosis Date    Encounter for gynecological examination (general) (routine) without abnormal findings     Pap test, as part of routine gynecological examination    Other conditions influencing health status     Mammogram normal       Past Surgical History  Past Surgical History:   Procedure Laterality Date    COLONOSCOPY      Colonoscopy    DILATION AND CURETTAGE OF UTERUS  04/13/2015    Dilation And Curettage    GALLBLADDER SURGERY  04/14/2015    Gallbladder Surgery    HYSTEROSCOPY  04/13/2015    Hysteroscopy With Endometrial Ablation    MOUTH SURGERY      Oral Surgery       Social History  Social History     Socioeconomic History    Marital status:      Spouse name: Not on file    Number of children: Not on file    Years of education: Not on file    Highest education level: Not on file   Occupational History    Not on file   Tobacco Use    Smoking status: Never    Smokeless tobacco: Never   Substance and Sexual Activity    Alcohol use: Never    Drug use: Never    Sexual activity: Not on file   Other Topics Concern    Not on file   Social History Narrative    Not on file     Social Determinants of Health     Financial Resource Strain: Not on file   Food Insecurity: Not on file   Transportation Needs: Not on file   Physical Activity: Not on file   Stress: Not on file   Social Connections: Not on file   Intimate Partner Violence: Not on file   Housing Stability: Not on file       Allergies  No Known Allergies    Medications  Current Outpatient Medications   Medication Sig Dispense Refill    losartan-hydrochlorothiazide (Hyzaar) 50-12.5 mg tablet TAKE 1 TABLET BY MOUTH  ONCE DAILY AS DIRECTED 90 tablet 1    omeprazole (PriLOSEC) 40 mg DR capsule TAKE 1 CAPSULE BY MOUTH ONCE DAILY 90 capsule 1    rosuvastatin (Crestor) 5 mg tablet Take one tablet once daily. 90 tablet 1    valACYclovir (Valtrex) 1 gram tablet TAKE 1 TABLET BY MOUTH ONCE DAILY 90 tablet 1     No current facility-administered medications for this visit.       Physical Exam:  The right hip and pelvis are without obvious signs of acute bony deformity, swelling, erythema, ecchymosis or instability. Active and passive range of motion are limited and painful. Log roll is positive. Straight leg raise test is negative.  FADIR is positive. ANTONIA is negative.  Crossover is negative. Hip strength is weak as compared to the opposite hip. The opposite hip is otherwise nontender and stable. Similar to previous exam    Imagin23 Right hip xrays show moderate to severe arthritis of the right hip with osteophyte formation.    The studies were reviewed with Dr. Montelongo personally in the office today.        L Inj/Asp: R hip joint on 2024 9:50 AM  Indications: pain  Details: 20 G needle, ultrasound-guided anterior approach  Medications: 80 mg methylPREDNISolone acetate 40 mg/mL; 2 mL lidocaine PF 10 mg/mL (1 %)  Outcome: tolerated well, no immediate complications  Procedure, treatment alternatives, risks and benefits explained, specific risks discussed. Consent was given by the patient. Immediately prior to procedure a time out was called to verify the correct patient, procedure, equipment, support staff and site/side marked as required. Patient was prepped and draped in the usual sterile fashion.         Ultrasound-guided right femoral acetabular joint corticosteroid injection. Risk, benefits, and alternatives were explained to the patient, verbal and written consent was obtained. The was placed in a supine position. The right hip was identified. The curved ultrasound transducer was placed over the hip joint. The  femoral vessels were identified, the probe was slid laterally and turned on its long axis to reveal the hip joint and capsular recess. The area of injection was cleaned with a chlorhexidine prep and was draped sterilely. Next, a 20-gauge spinal needle was then placed into the joint recess under ultrasound guidance. The needle tip was visualized throughout. 2 mL of Depo-Medrol and 2 mL of 1% lidocaine was injected into the joint space. Distention of the joint space was visualized on ultrasound. The entire contents of the syringe was injected, the fluid flowed freely without obstruction. The needle was then removed, the areas cleaned with an alcohol prep, and a sterile Band-Aid was placed. Ultrasound images were obtained throughout.    Problem List Items Addressed This Visit             ICD-10-CM    Arthritis of right hip M16.11    Relevant Orders    Point of Care Ultrasound (Completed)     Patient Discussion/Summary  We discuss treatment options including both conservative and surgical. She would like to move forward with a right hip intra articular injection.  Discussed risks versus benefits of having injections performed. Patient has elected to proceed with procedures. Please refer to procedure notes above. Discussed with patient to limit weightbearing activities over the next 24-48 hours, they can then progress to full activities as tolerated. Discussed with patient to avoid water submersion over the next 2 days. Discussed with patient to call me immediately if they develop worsening pain, rash, erythema, or fevers. She was provided options for joint replacement specialist and will do her own research. Follow up as needed.    Reinier Saeed,   Primary Care Sports Medicine Fellow    ** Please excuse any errors in grammar or translation related to this dictation. Voice recognition software was utilized to prepare this document. **

## 2024-04-25 ENCOUNTER — APPOINTMENT (OUTPATIENT)
Dept: PHYSICAL THERAPY | Facility: CLINIC | Age: 64
End: 2024-04-25
Payer: COMMERCIAL

## 2024-04-26 ENCOUNTER — APPOINTMENT (OUTPATIENT)
Dept: PHYSICAL THERAPY | Facility: CLINIC | Age: 64
End: 2024-04-26
Payer: COMMERCIAL

## 2024-05-03 ENCOUNTER — APPOINTMENT (OUTPATIENT)
Dept: PHYSICAL THERAPY | Facility: CLINIC | Age: 64
End: 2024-05-03
Payer: COMMERCIAL

## 2024-05-24 ENCOUNTER — APPOINTMENT (OUTPATIENT)
Dept: PHYSICAL THERAPY | Facility: CLINIC | Age: 64
End: 2024-05-24
Payer: COMMERCIAL

## 2024-05-31 ENCOUNTER — APPOINTMENT (OUTPATIENT)
Dept: PHYSICAL THERAPY | Facility: CLINIC | Age: 64
End: 2024-05-31
Payer: COMMERCIAL

## 2024-06-11 PROCEDURE — RXMED WILLOW AMBULATORY MEDICATION CHARGE

## 2024-06-14 ENCOUNTER — PHARMACY VISIT (OUTPATIENT)
Dept: PHARMACY | Facility: CLINIC | Age: 64
End: 2024-06-14
Payer: COMMERCIAL

## 2024-09-09 DIAGNOSIS — K21.9 GASTROESOPHAGEAL REFLUX DISEASE, UNSPECIFIED WHETHER ESOPHAGITIS PRESENT: ICD-10-CM

## 2024-09-09 DIAGNOSIS — R10.13 DYSPEPSIA: ICD-10-CM

## 2024-09-09 DIAGNOSIS — I10 HYPERTENSION, UNSPECIFIED TYPE: ICD-10-CM

## 2024-09-09 DIAGNOSIS — B00.1 COLD SORE: ICD-10-CM

## 2024-09-09 PROCEDURE — RXMED WILLOW AMBULATORY MEDICATION CHARGE

## 2024-09-09 RX ORDER — VALACYCLOVIR HYDROCHLORIDE 1 G/1
1000 TABLET, FILM COATED ORAL DAILY
Qty: 90 TABLET | Refills: 0 | Status: SHIPPED | OUTPATIENT
Start: 2024-09-09 | End: 2025-09-09

## 2024-09-09 RX ORDER — OMEPRAZOLE 40 MG/1
40 CAPSULE, DELAYED RELEASE ORAL DAILY
Qty: 90 CAPSULE | Refills: 0 | Status: SHIPPED | OUTPATIENT
Start: 2024-09-09 | End: 2025-09-09

## 2024-09-09 RX ORDER — LOSARTAN POTASSIUM AND HYDROCHLOROTHIAZIDE 12.5; 5 MG/1; MG/1
1 TABLET ORAL DAILY
Qty: 90 TABLET | Refills: 0 | Status: SHIPPED | OUTPATIENT
Start: 2024-09-09 | End: 2025-09-09

## 2024-09-11 DIAGNOSIS — E78.5 HYPERLIPIDEMIA, UNSPECIFIED HYPERLIPIDEMIA TYPE: ICD-10-CM

## 2024-09-11 PROCEDURE — RXMED WILLOW AMBULATORY MEDICATION CHARGE

## 2024-09-11 RX ORDER — ROSUVASTATIN CALCIUM 5 MG/1
5 TABLET, COATED ORAL DAILY
Qty: 90 TABLET | Refills: 1 | Status: SHIPPED | OUTPATIENT
Start: 2024-09-11

## 2024-09-12 ENCOUNTER — PHARMACY VISIT (OUTPATIENT)
Dept: PHARMACY | Facility: CLINIC | Age: 64
End: 2024-09-12
Payer: COMMERCIAL

## 2024-09-17 ASSESSMENT — ENCOUNTER SYMPTOMS
SHORTNESS OF BREATH: 0
NECK PAIN: 0
BLURRED VISION: 0
ORTHOPNEA: 0
PND: 0
HYPERTENSION: 1
SWEATS: 0
PALPITATIONS: 0
HEADACHES: 1

## 2024-09-20 ENCOUNTER — OFFICE VISIT (OUTPATIENT)
Dept: PRIMARY CARE | Facility: CLINIC | Age: 64
End: 2024-09-20
Payer: COMMERCIAL

## 2024-09-20 VITALS
OXYGEN SATURATION: 97 % | DIASTOLIC BLOOD PRESSURE: 72 MMHG | WEIGHT: 175 LBS | TEMPERATURE: 97.7 F | SYSTOLIC BLOOD PRESSURE: 142 MMHG | BODY MASS INDEX: 29.16 KG/M2 | RESPIRATION RATE: 18 BRPM | HEIGHT: 65 IN | HEART RATE: 65 BPM

## 2024-09-20 DIAGNOSIS — I10 PRIMARY HYPERTENSION: ICD-10-CM

## 2024-09-20 DIAGNOSIS — E78.5 HYPERLIPIDEMIA, UNSPECIFIED HYPERLIPIDEMIA TYPE: Primary | ICD-10-CM

## 2024-09-20 PROCEDURE — 3077F SYST BP >= 140 MM HG: CPT | Performed by: PHYSICIAN ASSISTANT

## 2024-09-20 PROCEDURE — 1036F TOBACCO NON-USER: CPT | Performed by: PHYSICIAN ASSISTANT

## 2024-09-20 PROCEDURE — 3008F BODY MASS INDEX DOCD: CPT | Performed by: PHYSICIAN ASSISTANT

## 2024-09-20 PROCEDURE — 99213 OFFICE O/P EST LOW 20 MIN: CPT | Performed by: PHYSICIAN ASSISTANT

## 2024-09-20 PROCEDURE — 3078F DIAST BP <80 MM HG: CPT | Performed by: PHYSICIAN ASSISTANT

## 2024-09-20 PROCEDURE — RXMED WILLOW AMBULATORY MEDICATION CHARGE

## 2024-09-20 RX ORDER — LOSARTAN POTASSIUM AND HYDROCHLOROTHIAZIDE 12.5; 1 MG/1; MG/1
1 TABLET ORAL DAILY
Qty: 90 TABLET | Refills: 1 | Status: SHIPPED | OUTPATIENT
Start: 2024-09-20 | End: 2024-09-20 | Stop reason: SDUPTHER

## 2024-09-20 RX ORDER — LOSARTAN POTASSIUM AND HYDROCHLOROTHIAZIDE 12.5; 1 MG/1; MG/1
1 TABLET ORAL DAILY
Qty: 90 TABLET | Refills: 1 | Status: SHIPPED | OUTPATIENT
Start: 2024-09-20 | End: 2025-03-19

## 2024-09-20 RX ORDER — GLUCOSAM/CHONDRO/HERB 149/HYAL 750-100 MG
TABLET ORAL
COMMUNITY

## 2024-09-20 RX ORDER — NIACIN 500 MG/1
500 TABLET, EXTENDED RELEASE ORAL NIGHTLY
COMMUNITY

## 2024-09-20 ASSESSMENT — ENCOUNTER SYMPTOMS
PALPITATIONS: 0
BLURRED VISION: 0
HEADACHES: 1
PND: 0
NECK PAIN: 0
SWEATS: 0
ORTHOPNEA: 0
SHORTNESS OF BREATH: 0
HYPERTENSION: 1

## 2024-09-20 NOTE — ASSESSMENT & PLAN NOTE
- Still couldn't tolerate statin so stopped the Crestor   - Now taking OTC supplements and working on diet modifications  - Labs will be due in March to recheck

## 2024-09-20 NOTE — TELEPHONE ENCOUNTER
Patient called and said losartan Hydrochlorothiazide is not covered by her insurance at [facility] can you please send it [facility] our office

## 2024-09-20 NOTE — PROGRESS NOTES
"Subjective   Patient ID: Shilpi Barraza is a 64 y.o. female who presents for Hypertension (Pt here today for HTN past 3 weeks consistency; uses at home cuff and has stopped at [facility] to check as well. This morning BP was 162/82. ) and Medication Question (Pt also stopped taking her Crestor due to pain/cramps in legs; so I added to allergies as intolerance. ).    Hypertension  This is a recurrent problem. The current episode started 1 to 4 weeks ago. The problem has been gradually worsening since onset. The problem is resistant. Associated symptoms include anxiety and headaches. Pertinent negatives include no blurred vision, chest pain, malaise/fatigue, neck pain, orthopnea, palpitations, peripheral edema, PND, shortness of breath or sweats. There are no associated agents to hypertension. Risk factors for coronary artery disease include dyslipidemia. There are no compliance problems.         HTN   - thought it was due to caffeine she was drinking so cut that out which didn't help   - Takes her medicine daily   - Cutting back on salty foods  - No exercise   - Not a smoker   - No alcohol       Review of Systems   Constitutional:  Negative for malaise/fatigue.   Eyes:  Negative for blurred vision.   Respiratory:  Negative for shortness of breath.    Cardiovascular:  Negative for chest pain, palpitations, orthopnea and PND.   Musculoskeletal:  Negative for neck pain.   Neurological:  Positive for headaches.       Objective   /72   Pulse 65   Temp 36.5 °C (97.7 °F)   Resp 18   Ht 1.651 m (5' 5\")   Wt 79.4 kg (175 lb)   SpO2 97%   BMI 29.12 kg/m²     Physical Exam  Constitutional:       Appearance: Normal appearance.   Cardiovascular:      Rate and Rhythm: Normal rate and regular rhythm.      Pulses: Normal pulses.      Heart sounds: Normal heart sounds. No murmur heard.  Pulmonary:      Effort: Pulmonary effort is normal.      Breath sounds: Normal breath sounds.   Musculoskeletal:      Right lower leg: " No edema.      Left lower leg: No edema.   Neurological:      Mental Status: She is alert.   Psychiatric:         Mood and Affect: Mood and affect normal.         Behavior: Behavior normal.         Thought Content: Thought content normal.         Judgment: Judgment normal.         Assessment/Plan     Problem List Items Addressed This Visit       Hyperlipidemia - Primary    Overview     - Most recent labs: Total chol 238, LDL incalculable, HLD 36.0, trigs 516 (3/19/24)  - Current meds: OTC fish oil and niacin supplements   - Historical med: atorvastatin 40 mg (muscle cramps), rosuvastatin (muscle cramps)          Current Assessment & Plan     - Still couldn't tolerate statin so stopped the Crestor   - Now taking OTC supplements and working on diet modifications  - Labs will be due in March to recheck          Primary hypertension    Overview     - Current meds: losartan-hydrochlorothiazide 50-12.5 mg          Current Assessment & Plan     - BP still running high   - Plan to taper up to losartan-hydrochlorothiazide 100-12.5 mg   - Recommend diet and lifestyle modifications e.g. more exercise, low sodium diet          Relevant Medications    losartan-hydrochlorothiazide (Hyzaar) 100-12.5 mg tablet

## 2024-09-20 NOTE — ASSESSMENT & PLAN NOTE
- BP still running high   - Plan to taper up to losartan-hydrochlorothiazide 100-12.5 mg   - Recommend diet and lifestyle modifications e.g. more exercise, low sodium diet

## 2024-09-23 ENCOUNTER — PHARMACY VISIT (OUTPATIENT)
Dept: PHARMACY | Facility: CLINIC | Age: 64
End: 2024-09-23
Payer: COMMERCIAL

## 2024-10-14 ENCOUNTER — OFFICE VISIT (OUTPATIENT)
Dept: URGENT CARE | Age: 64
End: 2024-10-14
Payer: COMMERCIAL

## 2024-10-14 VITALS
RESPIRATION RATE: 16 BRPM | OXYGEN SATURATION: 98 % | HEIGHT: 64 IN | BODY MASS INDEX: 29.53 KG/M2 | HEART RATE: 74 BPM | TEMPERATURE: 98 F | SYSTOLIC BLOOD PRESSURE: 146 MMHG | WEIGHT: 173 LBS | DIASTOLIC BLOOD PRESSURE: 74 MMHG

## 2024-10-14 DIAGNOSIS — R30.0 DYSURIA: ICD-10-CM

## 2024-10-14 DIAGNOSIS — N30.01 ACUTE CYSTITIS WITH HEMATURIA: Primary | ICD-10-CM

## 2024-10-14 LAB
POC APPEARANCE, URINE: CLEAR
POC BILIRUBIN, URINE: NEGATIVE
POC BLOOD, URINE: ABNORMAL
POC COLOR, URINE: YELLOW
POC GLUCOSE, URINE: NEGATIVE MG/DL
POC KETONES, URINE: NEGATIVE MG/DL
POC LEUKOCYTES, URINE: ABNORMAL
POC NITRITE,URINE: NEGATIVE
POC PH, URINE: 5.5 PH
POC PROTEIN, URINE: ABNORMAL MG/DL
POC SPECIFIC GRAVITY, URINE: >=1.03
POC UROBILINOGEN, URINE: 0.2 EU/DL

## 2024-10-14 PROCEDURE — 81003 URINALYSIS AUTO W/O SCOPE: CPT | Performed by: NURSE PRACTITIONER

## 2024-10-14 PROCEDURE — 87186 SC STD MICRODIL/AGAR DIL: CPT

## 2024-10-14 PROCEDURE — 3077F SYST BP >= 140 MM HG: CPT | Performed by: NURSE PRACTITIONER

## 2024-10-14 PROCEDURE — 3078F DIAST BP <80 MM HG: CPT | Performed by: NURSE PRACTITIONER

## 2024-10-14 PROCEDURE — 3008F BODY MASS INDEX DOCD: CPT | Performed by: NURSE PRACTITIONER

## 2024-10-14 PROCEDURE — 87086 URINE CULTURE/COLONY COUNT: CPT

## 2024-10-14 PROCEDURE — 99204 OFFICE O/P NEW MOD 45 MIN: CPT | Performed by: NURSE PRACTITIONER

## 2024-10-14 RX ORDER — PHENAZOPYRIDINE HYDROCHLORIDE 100 MG/1
200 TABLET, FILM COATED ORAL 3 TIMES DAILY PRN
Qty: 18 TABLET | Refills: 0 | Status: SHIPPED | OUTPATIENT
Start: 2024-10-14 | End: 2024-10-17

## 2024-10-14 RX ORDER — SULFAMETHOXAZOLE AND TRIMETHOPRIM 800; 160 MG/1; MG/1
1 TABLET ORAL 2 TIMES DAILY
Qty: 10 TABLET | Refills: 0 | Status: SHIPPED | OUTPATIENT
Start: 2024-10-14 | End: 2024-10-19

## 2024-10-14 NOTE — PROGRESS NOTES
Subjective   Patient ID: Shilpi Barraza is a 64 y.o. female. They present today with a chief complaint of Difficulty Urinating (Started 2 weeks ago ) and Urinary Urgency.    History of Present Illness  HPI    Pt presents to urgent care with c/o concern for UTI.  Pt reports intermittent dysuria, increased frequency, pink tinge when wiping after urinating.  Reports mild low back pain, denies flank pain .  Pt denies CP, SOB, palpitations, fevers, abd pain, n/v/d, sick contacts, recent travel.      Past Medical History  Allergies as of 10/14/2024 - Reviewed 10/14/2024   Allergen Reaction Noted    Crestor [rosuvastatin] Myalgia 09/20/2024       (Not in a hospital admission)       Past Medical History:   Diagnosis Date    Encounter for gynecological examination (general) (routine) without abnormal findings     Pap test, as part of routine gynecological examination    Other conditions influencing health status     Mammogram normal       Past Surgical History:   Procedure Laterality Date    COLONOSCOPY      Colonoscopy    DILATION AND CURETTAGE OF UTERUS  04/13/2015    Dilation And Curettage    GALLBLADDER SURGERY  04/14/2015    Gallbladder Surgery    HYSTEROSCOPY  04/13/2015    Hysteroscopy With Endometrial Ablation    MOUTH SURGERY      Oral Surgery        reports that she has never smoked. She has never used smokeless tobacco. She reports that she does not drink alcohol and does not use drugs.    Review of Systems  Review of Systems   Constitutional: Negative.    HENT: Negative.     Eyes: Negative.    Respiratory: Negative.     Cardiovascular:  Negative for chest pain and palpitations.   Gastrointestinal: Negative.    Endocrine: Negative.    Genitourinary:  Positive for dysuria, frequency and hematuria. Negative for flank pain.   Musculoskeletal: Negative.    Skin: Negative.    Allergic/Immunologic: Negative.    Neurological: Negative.    Hematological: Negative.    Psychiatric/Behavioral: Negative.     All other  "systems reviewed and are negative.                                 Objective    Vitals:    10/14/24 1828   BP: 146/74   BP Location: Left arm   Patient Position: Sitting   BP Cuff Size: Adult   Pulse: 74   Resp: 16   Temp: 36.7 °C (98 °F)   SpO2: 98%   Weight: 78.5 kg (173 lb)   Height: 1.626 m (5' 4\")     No LMP recorded. Patient is postmenopausal.    Physical Exam  Vitals and nursing note reviewed.   Constitutional:       General: She is not in acute distress.     Appearance: Normal appearance. She is not ill-appearing or toxic-appearing.   HENT:      Head: Atraumatic.      Right Ear: Tympanic membrane, ear canal and external ear normal.      Left Ear: Tympanic membrane, ear canal and external ear normal.      Nose: Nose normal.      Mouth/Throat:      Mouth: Mucous membranes are moist.      Pharynx: Oropharynx is clear. No oropharyngeal exudate or posterior oropharyngeal erythema.   Eyes:      Extraocular Movements: Extraocular movements intact.      Conjunctiva/sclera: Conjunctivae normal.      Pupils: Pupils are equal, round, and reactive to light.   Cardiovascular:      Rate and Rhythm: Normal rate and regular rhythm.      Pulses: Normal pulses.      Heart sounds: Normal heart sounds.   Pulmonary:      Effort: Pulmonary effort is normal.      Breath sounds: Normal breath sounds.   Abdominal:      General: Abdomen is flat. Bowel sounds are normal.      Palpations: Abdomen is soft.      Tenderness: There is no abdominal tenderness. There is no right CVA tenderness or left CVA tenderness.   Musculoskeletal:         General: Normal range of motion.      Cervical back: Normal range of motion and neck supple. No tenderness.   Skin:     General: Skin is warm and dry.      Capillary Refill: Capillary refill takes less than 2 seconds.   Neurological:      General: No focal deficit present.      Mental Status: She is alert and oriented to person, place, and time.   Psychiatric:         Mood and Affect: Mood normal.    "      Behavior: Behavior normal.         Thought Content: Thought content normal.         Procedures    Point of Care Test & Imaging Results from this visit  Results for orders placed or performed in visit on 10/14/24   POCT UA Automated manually resulted   Result Value Ref Range    POC Color, Urine Yellow Straw, Yellow, Light-Yellow    POC Appearance, Urine Clear Clear    POC Glucose, Urine NEGATIVE NEGATIVE mg/dl    POC Bilirubin, Urine NEGATIVE NEGATIVE    POC Ketones, Urine NEGATIVE NEGATIVE mg/dl    POC Specific Gravity, Urine >=1.030 1.005 - 1.035    POC Blood, Urine LARGE (3+) (A) NEGATIVE    POC PH, Urine 5.5 No Reference Range Established PH    POC Protein, Urine TRACE (A) NEGATIVE, 30 (1+) mg/dl    POC Urobilinogen, Urine 0.2 0.2, 1.0 EU/DL    Poc Nitrite, Urine NEGATIVE NEGATIVE    POC Leukocytes, Urine MODERATE (2+) (A) NEGATIVE      No results found.    Diagnostic study results (if any) were reviewed by MERARY Campbell.    Assessment/Plan   Allergies, medications, history, and pertinent labs/EKGs/Imaging reviewed by MERARY Campbell.     Medical Decision Making  Urgent Care Course:  Patient presents to the ED with dysuria, urinary frequency.  Patient is afebrile, hemodynamically stable.  Pt denies fever, n/v, cp, sob, hematuria, rash, and diarrhea.  Patient believes that they have a UTI.  UA shows large blood, trace protein, moderate leukocytes.  Will send urine for culture.   Patient educated about UTIs.  Patient given prescription for Bactrim, Pyridium, given UTI warning signs and will f/u with pcp.   Prior external records reviewed: Outpatient records  Reviewed pertinent findings from resulted labs:  UA - UTI  Independent Hx provided by:  Patient  Pt's case/impression summarized and discussed with:  Patient  Likely Dx given clinical picture:  UTI   Although not an exhaustive list of Differential Diagnosis (though considered), patient's HPI, PE, and other findings are not suggestive  of:  pyelonephritis, kidney stone, urethral stricture, bladder spasms, bladder cancer   Patient at time of discharge was clinically well-appearing and HDS for outpatient management. The patient and/or family was given the opportunity to ask questions prior to discharge, understood my verbal discussion of the plans for treatment, expected course, indications to return to  or seek further treatment in ED, and the need for timely follow up as directed.    Condition: Stable  Disposition:  Discharge     Orders and Diagnoses  Diagnoses and all orders for this visit:  Acute cystitis with hematuria  -     sulfamethoxazole-trimethoprim (Bactrim DS) 800-160 mg tablet; Take 1 tablet by mouth 2 times a day for 5 days.  -     phenazopyridine (Pyridium) 100 mg tablet; Take 2 tablets (200 mg) by mouth 3 times a day as needed for bladder spasms for up to 3 days.  -     Urine Culture  Dysuria  -     POCT UA Automated manually resulted  -     sulfamethoxazole-trimethoprim (Bactrim DS) 800-160 mg tablet; Take 1 tablet by mouth 2 times a day for 5 days.  -     phenazopyridine (Pyridium) 100 mg tablet; Take 2 tablets (200 mg) by mouth 3 times a day as needed for bladder spasms for up to 3 days.      Medical Admin Record      Patient disposition: Home    Electronically signed by MERARY Campbell  8:08 AM

## 2024-10-16 ASSESSMENT — ENCOUNTER SYMPTOMS
ALLERGIC/IMMUNOLOGIC NEGATIVE: 1
MUSCULOSKELETAL NEGATIVE: 1
HEMATOLOGIC/LYMPHATIC NEGATIVE: 1
CONSTITUTIONAL NEGATIVE: 1
NEUROLOGICAL NEGATIVE: 1
RESPIRATORY NEGATIVE: 1
GASTROINTESTINAL NEGATIVE: 1
HEMATURIA: 1
FLANK PAIN: 0
ENDOCRINE NEGATIVE: 1
PALPITATIONS: 0
FREQUENCY: 1
EYES NEGATIVE: 1
PSYCHIATRIC NEGATIVE: 1
DYSURIA: 1

## 2024-10-17 LAB — BACTERIA UR CULT: ABNORMAL

## 2024-10-21 ENCOUNTER — OFFICE VISIT (OUTPATIENT)
Dept: URGENT CARE | Age: 64
End: 2024-10-21
Payer: COMMERCIAL

## 2024-10-21 VITALS
BODY MASS INDEX: 29.02 KG/M2 | WEIGHT: 170 LBS | RESPIRATION RATE: 18 BRPM | TEMPERATURE: 97.9 F | SYSTOLIC BLOOD PRESSURE: 136 MMHG | OXYGEN SATURATION: 96 % | HEART RATE: 81 BPM | DIASTOLIC BLOOD PRESSURE: 67 MMHG | HEIGHT: 64 IN

## 2024-10-21 DIAGNOSIS — H10.31 ACUTE CONJUNCTIVITIS OF RIGHT EYE, UNSPECIFIED ACUTE CONJUNCTIVITIS TYPE: Primary | ICD-10-CM

## 2024-10-21 PROCEDURE — 3078F DIAST BP <80 MM HG: CPT | Performed by: PHYSICIAN ASSISTANT

## 2024-10-21 PROCEDURE — 3075F SYST BP GE 130 - 139MM HG: CPT | Performed by: PHYSICIAN ASSISTANT

## 2024-10-21 PROCEDURE — 99214 OFFICE O/P EST MOD 30 MIN: CPT | Performed by: PHYSICIAN ASSISTANT

## 2024-10-21 PROCEDURE — 3008F BODY MASS INDEX DOCD: CPT | Performed by: PHYSICIAN ASSISTANT

## 2024-10-21 RX ORDER — TOBRAMYCIN AND DEXAMETHASONE 3; 1 MG/ML; MG/ML
1 SUSPENSION/ DROPS OPHTHALMIC 4 TIMES DAILY
Qty: 2.5 ML | Refills: 0 | Status: SHIPPED | OUTPATIENT
Start: 2024-10-21 | End: 2024-10-28

## 2024-10-21 NOTE — PROGRESS NOTES
"Subjective   Patient ID: Shilpi Barraza is a 64 y.o. female who presents for Eye Problem (Rt eye problem x1 day. Has hx of problems w rt eye).  HPI  Patient presents for right eye redness and drainage.  Patient reports 1 day of this without known precipitating event.  Does admit to being in a public setting just prior to onset.  No preceding upper respiratory infection.  No change in vision.  No injury or noxious exposure.  No other complaints.    Review of Systems    Constitutional:  See HPI   Eye: See HPI  Neurologic:  Alert and oriented X4, No numbness, No tingling.    All other systems are negative     Objective     /67   Pulse 81   Temp 36.6 °C (97.9 °F) (Oral)   Resp 18   Ht 1.626 m (5' 4\")   Wt 77.1 kg (170 lb)   SpO2 96%   BMI 29.18 kg/m²     Physical Exam    General:  Alert and oriented, No acute distress.    Eye:  Pupils are equal, round and reactive to light, right conjunctival is injected and glossy; no grossly visible foreign body or abrasion  HENT:  Normocephalic,   Neck:  Supple    Respiratory: Respirations are non-labored   Musculoskeletal: Normal ROM and strength  Integumentary:  Warm, Dry, Intact, No pallor, No rash.    Neurologic:  Alert, Oriented, Normal sensory, Cranial Nerves II-XII are grossly intact  Psychiatric:  Cooperative, Appropriate mood & affect.    Assessment/Plan   Exam is consistent with conjunctivitis on the right.  Prescription for TobraDex.  Patient's clinical presentation is otherwise unremarkable at this time. Patient is discharged with instructions to follow-up with primary care or seek emergency medical attention for worsening symptoms or any new concerns.  Problem List Items Addressed This Visit    None  Visit Diagnoses       Acute conjunctivitis of right eye, unspecified acute conjunctivitis type    -  Primary    Relevant Medications    tobramycin-dexamethasone (Tobradex) ophthalmic suspension            Final diagnoses:   [H10.31] Acute conjunctivitis of " right eye, unspecified acute conjunctivitis type

## 2024-10-29 ENCOUNTER — OFFICE VISIT (OUTPATIENT)
Dept: URGENT CARE | Age: 64
End: 2024-10-29
Payer: COMMERCIAL

## 2024-10-29 VITALS
TEMPERATURE: 98.5 F | WEIGHT: 170 LBS | HEIGHT: 64 IN | OXYGEN SATURATION: 98 % | HEART RATE: 85 BPM | SYSTOLIC BLOOD PRESSURE: 134 MMHG | BODY MASS INDEX: 29.02 KG/M2 | RESPIRATION RATE: 16 BRPM | DIASTOLIC BLOOD PRESSURE: 75 MMHG

## 2024-10-29 DIAGNOSIS — R30.0 DYSURIA: ICD-10-CM

## 2024-10-29 DIAGNOSIS — N30.01 ACUTE CYSTITIS WITH HEMATURIA: Primary | ICD-10-CM

## 2024-10-29 LAB
POC APPEARANCE, URINE: ABNORMAL
POC BILIRUBIN, URINE: NEGATIVE
POC BLOOD, URINE: ABNORMAL
POC COLOR, URINE: YELLOW
POC GLUCOSE, URINE: NEGATIVE MG/DL
POC KETONES, URINE: NEGATIVE MG/DL
POC LEUKOCYTES, URINE: ABNORMAL
POC NITRITE,URINE: NEGATIVE
POC PH, URINE: 6 PH
POC PROTEIN, URINE: ABNORMAL MG/DL
POC SPECIFIC GRAVITY, URINE: 1.02
POC UROBILINOGEN, URINE: 0.2 EU/DL

## 2024-10-29 PROCEDURE — 99214 OFFICE O/P EST MOD 30 MIN: CPT | Performed by: NURSE PRACTITIONER

## 2024-10-29 PROCEDURE — 3078F DIAST BP <80 MM HG: CPT | Performed by: NURSE PRACTITIONER

## 2024-10-29 PROCEDURE — 87086 URINE CULTURE/COLONY COUNT: CPT

## 2024-10-29 PROCEDURE — 3008F BODY MASS INDEX DOCD: CPT | Performed by: NURSE PRACTITIONER

## 2024-10-29 PROCEDURE — 3075F SYST BP GE 130 - 139MM HG: CPT | Performed by: NURSE PRACTITIONER

## 2024-10-29 PROCEDURE — 81003 URINALYSIS AUTO W/O SCOPE: CPT | Performed by: NURSE PRACTITIONER

## 2024-10-29 PROCEDURE — 1036F TOBACCO NON-USER: CPT | Performed by: NURSE PRACTITIONER

## 2024-10-29 RX ORDER — SULFAMETHOXAZOLE AND TRIMETHOPRIM 800; 160 MG/1; MG/1
1 TABLET ORAL 2 TIMES DAILY
Qty: 10 TABLET | Refills: 0 | Status: SHIPPED | OUTPATIENT
Start: 2024-10-29 | End: 2024-11-03

## 2024-10-29 ASSESSMENT — ENCOUNTER SYMPTOMS
ENDOCRINE NEGATIVE: 1
PALPITATIONS: 0
CONSTITUTIONAL NEGATIVE: 1
NEUROLOGICAL NEGATIVE: 1
ALLERGIC/IMMUNOLOGIC NEGATIVE: 1
RESPIRATORY NEGATIVE: 1
MUSCULOSKELETAL NEGATIVE: 1
DYSURIA: 1
GASTROINTESTINAL NEGATIVE: 1
EYES NEGATIVE: 1
HEMATOLOGIC/LYMPHATIC NEGATIVE: 1
PSYCHIATRIC NEGATIVE: 1
FREQUENCY: 1

## 2024-10-31 LAB — BACTERIA UR CULT: NORMAL

## 2024-12-07 DIAGNOSIS — K21.9 GASTROESOPHAGEAL REFLUX DISEASE, UNSPECIFIED WHETHER ESOPHAGITIS PRESENT: ICD-10-CM

## 2024-12-07 DIAGNOSIS — I10 HYPERTENSION, UNSPECIFIED TYPE: ICD-10-CM

## 2024-12-07 DIAGNOSIS — B00.1 COLD SORE: ICD-10-CM

## 2024-12-07 DIAGNOSIS — R10.13 DYSPEPSIA: ICD-10-CM

## 2024-12-07 RX ORDER — OMEPRAZOLE 40 MG/1
40 CAPSULE, DELAYED RELEASE ORAL DAILY
Qty: 90 CAPSULE | Refills: 0 | Status: SHIPPED | OUTPATIENT
Start: 2024-12-07 | End: 2025-12-07

## 2024-12-07 RX ORDER — VALACYCLOVIR HYDROCHLORIDE 1 G/1
1000 TABLET, FILM COATED ORAL DAILY
Qty: 90 TABLET | Refills: 0 | Status: SHIPPED | OUTPATIENT
Start: 2024-12-07 | End: 2025-12-07

## 2024-12-07 RX ORDER — LOSARTAN POTASSIUM AND HYDROCHLOROTHIAZIDE 12.5; 5 MG/1; MG/1
1 TABLET ORAL DAILY
Qty: 90 TABLET | Refills: 0 | Status: SHIPPED | OUTPATIENT
Start: 2024-12-07 | End: 2025-12-07

## 2024-12-09 ENCOUNTER — PHARMACY VISIT (OUTPATIENT)
Dept: PHARMACY | Facility: CLINIC | Age: 64
End: 2024-12-09
Payer: COMMERCIAL

## 2024-12-09 PROCEDURE — RXMED WILLOW AMBULATORY MEDICATION CHARGE

## 2024-12-12 ENCOUNTER — OFFICE VISIT (OUTPATIENT)
Dept: PRIMARY CARE | Facility: CLINIC | Age: 64
End: 2024-12-12
Payer: COMMERCIAL

## 2024-12-12 VITALS
HEART RATE: 70 BPM | DIASTOLIC BLOOD PRESSURE: 90 MMHG | SYSTOLIC BLOOD PRESSURE: 140 MMHG | TEMPERATURE: 98.6 F | BODY MASS INDEX: 29.27 KG/M2 | RESPIRATION RATE: 16 BRPM | WEIGHT: 170.5 LBS

## 2024-12-12 DIAGNOSIS — R31.9 HEMATURIA, UNSPECIFIED TYPE: Primary | ICD-10-CM

## 2024-12-12 DIAGNOSIS — R30.0 BURNING WITH URINATION: ICD-10-CM

## 2024-12-12 DIAGNOSIS — N39.0 RECURRENT UTI: ICD-10-CM

## 2024-12-12 LAB
POC APPEARANCE, URINE: ABNORMAL
POC BILIRUBIN, URINE: NEGATIVE
POC BLOOD, URINE: ABNORMAL
POC COLOR, URINE: YELLOW
POC GLUCOSE, URINE: NEGATIVE MG/DL
POC KETONES, URINE: NEGATIVE MG/DL
POC LEUKOCYTES, URINE: ABNORMAL
POC NITRITE,URINE: NEGATIVE
POC PH, URINE: 5 PH
POC PROTEIN, URINE: ABNORMAL MG/DL
POC SPECIFIC GRAVITY, URINE: 1.02
POC UROBILINOGEN, URINE: 0.2 EU/DL

## 2024-12-12 PROCEDURE — 99214 OFFICE O/P EST MOD 30 MIN: CPT | Performed by: NURSE PRACTITIONER

## 2024-12-12 PROCEDURE — 1036F TOBACCO NON-USER: CPT | Performed by: NURSE PRACTITIONER

## 2024-12-12 PROCEDURE — 3080F DIAST BP >= 90 MM HG: CPT | Performed by: NURSE PRACTITIONER

## 2024-12-12 PROCEDURE — 3077F SYST BP >= 140 MM HG: CPT | Performed by: NURSE PRACTITIONER

## 2024-12-12 PROCEDURE — 81002 URINALYSIS NONAUTO W/O SCOPE: CPT | Performed by: NURSE PRACTITIONER

## 2024-12-12 PROCEDURE — 87086 URINE CULTURE/COLONY COUNT: CPT

## 2024-12-12 PROCEDURE — 87186 SC STD MICRODIL/AGAR DIL: CPT

## 2024-12-12 RX ORDER — CIPROFLOXACIN 500 MG/1
500 TABLET ORAL 2 TIMES DAILY
Qty: 10 TABLET | Refills: 0 | Status: SHIPPED | OUTPATIENT
Start: 2024-12-12 | End: 2024-12-17

## 2024-12-12 ASSESSMENT — ENCOUNTER SYMPTOMS
CHILLS: 0
ABDOMINAL PAIN: 1
DIARRHEA: 0
FEVER: 0
DYSURIA: 1
CONSTIPATION: 0
HEMATURIA: 0
BACK PAIN: 0
FREQUENCY: 1

## 2024-12-12 NOTE — PROGRESS NOTES
Subjective   Patient ID: Shilpi Barraza is a 64 y.o. female who presents for Vaginal Pain.    Vaginal Pain  The patient's primary symptoms include pelvic pain. The patient's pertinent negatives include no genital itching, genital lesions, genital odor, genital rash, missed menses, vaginal bleeding or vaginal discharge. This is a recurrent problem. The current episode started more than 1 month ago. The problem occurs constantly. The problem has been gradually worsening. She is not pregnant. Associated symptoms include abdominal pain, dysuria, frequency and urgency. Pertinent negatives include no back pain, chills, constipation, diarrhea, fever, hematuria or rash. Nothing aggravates the symptoms. She has tried antibiotics for the symptoms. The treatment provided no relief. She is sexually active. No, her partner does not have an STD. She uses nothing for contraception. She is postmenopausal.      Patient has pressure, feels the urge to urinate. She's getting up every hour to urinate. She doesn't see any blood in the urine.     She doesn't drink any alcohol, drinks some caffeine.    She saw Dr. Hunt in the past who said she didn't have a bladder prolapse.     Review of Systems   Constitutional:  Negative for chills and fever.   Gastrointestinal:  Positive for abdominal pain. Negative for constipation and diarrhea.   Genitourinary:  Positive for dysuria, frequency, pelvic pain, urgency and vaginal pain. Negative for hematuria, missed menses and vaginal discharge.   Musculoskeletal:  Negative for back pain.   Skin:  Negative for rash.       Objective   /90   Pulse 70   Temp 37 °C (98.6 °F) (Tympanic)   Resp 16   Wt 77.3 kg (170 lb 8 oz)   BMI 29.27 kg/m²     Physical Exam  Constitutional:       Appearance: Normal appearance.   Cardiovascular:      Rate and Rhythm: Normal rate and regular rhythm.      Pulses: Normal pulses.      Heart sounds: Normal heart sounds.   Pulmonary:      Effort: Pulmonary effort is  normal.      Breath sounds: Normal breath sounds.   Abdominal:      General: Bowel sounds are normal.      Palpations: Abdomen is soft.      Tenderness: There is abdominal tenderness in the suprapubic area. There is no right CVA tenderness or left CVA tenderness.   Musculoskeletal:         General: Normal range of motion.   Skin:     General: Skin is warm and dry.   Neurological:      Mental Status: She is alert.   Psychiatric:         Mood and Affect: Mood normal.         Behavior: Behavior normal.         Assessment/Plan   Problem List Items Addressed This Visit    None  Visit Diagnoses       Hematuria, unspecified type    -  Primary    Relevant Orders    CT abdomen pelvis wo IV contrast    Burning with urination        Relevant Orders    POCT UA (nonautomated) manually resulted (Completed)    Urine Culture    CT abdomen pelvis wo IV contrast    Recurrent UTI        Relevant Medications    ciprofloxacin (Cipro) 500 mg tablet    Other Relevant Orders    CT abdomen pelvis wo IV contrast          Patient Instructions   Patient to start taking cipro for UTI. We will call with culture results when available. Discussed CT of abdomen/pelvis. Also encouraged to follow-up with Dr. Hunt. Follow-up with PCP in 1 week or sooner if needed. Call the office if any problems or concerns in the meantime.     More than 50% of the visit was spent counseling the patient. A total of more than 30 minutes was spent.

## 2024-12-13 NOTE — PATIENT INSTRUCTIONS
Patient to start taking cipro for UTI. We will call with culture results when available. Discussed CT of abdomen/pelvis. Also encouraged to follow-up with Dr. Hunt. Follow-up with PCP in 1 week or sooner if needed. Call the office if any problems or concerns in the meantime.

## 2024-12-15 LAB — BACTERIA UR CULT: ABNORMAL

## 2024-12-27 ENCOUNTER — HOSPITAL ENCOUNTER (OUTPATIENT)
Dept: RADIOLOGY | Facility: HOSPITAL | Age: 64
Discharge: HOME | End: 2024-12-27
Payer: COMMERCIAL

## 2024-12-27 DIAGNOSIS — R30.0 BURNING WITH URINATION: ICD-10-CM

## 2024-12-27 DIAGNOSIS — N39.0 RECURRENT UTI: ICD-10-CM

## 2024-12-27 DIAGNOSIS — R31.9 HEMATURIA, UNSPECIFIED TYPE: ICD-10-CM

## 2024-12-27 PROCEDURE — 74176 CT ABD & PELVIS W/O CONTRAST: CPT

## 2025-01-06 ENCOUNTER — OFFICE VISIT (OUTPATIENT)
Dept: URGENT CARE | Age: 65
End: 2025-01-06
Payer: COMMERCIAL

## 2025-01-06 VITALS
TEMPERATURE: 97 F | OXYGEN SATURATION: 99 % | HEIGHT: 64 IN | BODY MASS INDEX: 29.02 KG/M2 | WEIGHT: 170 LBS | RESPIRATION RATE: 18 BRPM | SYSTOLIC BLOOD PRESSURE: 190 MMHG | DIASTOLIC BLOOD PRESSURE: 80 MMHG | HEART RATE: 99 BPM

## 2025-01-06 DIAGNOSIS — J01.00 ACUTE NON-RECURRENT MAXILLARY SINUSITIS: Primary | ICD-10-CM

## 2025-01-06 DIAGNOSIS — H10.31 ACUTE BACTERIAL CONJUNCTIVITIS OF RIGHT EYE: ICD-10-CM

## 2025-01-06 DIAGNOSIS — I10 BENIGN HYPERTENSION: ICD-10-CM

## 2025-01-06 DIAGNOSIS — R30.0 DYSURIA: ICD-10-CM

## 2025-01-06 PROCEDURE — 99214 OFFICE O/P EST MOD 30 MIN: CPT | Performed by: NURSE PRACTITIONER

## 2025-01-06 PROCEDURE — 1036F TOBACCO NON-USER: CPT | Performed by: NURSE PRACTITIONER

## 2025-01-06 PROCEDURE — 3079F DIAST BP 80-89 MM HG: CPT | Performed by: NURSE PRACTITIONER

## 2025-01-06 PROCEDURE — 3008F BODY MASS INDEX DOCD: CPT | Performed by: NURSE PRACTITIONER

## 2025-01-06 PROCEDURE — 87086 URINE CULTURE/COLONY COUNT: CPT

## 2025-01-06 PROCEDURE — 87186 SC STD MICRODIL/AGAR DIL: CPT

## 2025-01-06 PROCEDURE — 81003 URINALYSIS AUTO W/O SCOPE: CPT | Performed by: NURSE PRACTITIONER

## 2025-01-06 PROCEDURE — 3077F SYST BP >= 140 MM HG: CPT | Performed by: NURSE PRACTITIONER

## 2025-01-06 RX ORDER — POLYMYXIN B SULFATE AND TRIMETHOPRIM 1; 10000 MG/ML; [USP'U]/ML
1 SOLUTION OPHTHALMIC 4 TIMES DAILY
Qty: 10 ML | Refills: 0 | Status: SHIPPED | OUTPATIENT
Start: 2025-01-06 | End: 2025-01-13

## 2025-01-06 RX ORDER — AMOXICILLIN AND CLAVULANATE POTASSIUM 875; 125 MG/1; MG/1
1 TABLET, FILM COATED ORAL 2 TIMES DAILY
Qty: 14 TABLET | Refills: 0 | Status: SHIPPED | OUTPATIENT
Start: 2025-01-06 | End: 2025-01-13

## 2025-01-06 ASSESSMENT — PATIENT HEALTH QUESTIONNAIRE - PHQ9
SUM OF ALL RESPONSES TO PHQ9 QUESTIONS 1 & 2: 0
1. LITTLE INTEREST OR PLEASURE IN DOING THINGS: NOT AT ALL
2. FEELING DOWN, DEPRESSED OR HOPELESS: NOT AT ALL

## 2025-01-06 ASSESSMENT — ENCOUNTER SYMPTOMS
DEPRESSION: 0
DYSURIA: 1
SINUS COMPLAINT: 1

## 2025-01-06 ASSESSMENT — VISUAL ACUITY: OU: 1

## 2025-01-06 NOTE — PATIENT INSTRUCTIONS
Dysuria:  -UA completed  -Good oral hydration; avoid holding urine  -C&S sent; will change abx if C&S suggestive  - Discussed UTI prevention methods with patient  -Advised on s/s to seek emergent care for  -f/u with PCP in the next week for re-evaluation of Urine  - Warm compress to back or lower abd if needed    Sinusitis:  - Good oral hydration  - Take abx and nasal spray as instructed  - Advised on s/s to seek emergent care for  - No erythema or edema to face  - Tylenol/Motrin as needed for pain or fever  - Kevon's vapor rub to chest; humidifier; warm showers/bath  -f/u with PCP in the next few days for re-evaluation    Hypertension (High Blood Pressure):  - Stop Taking cough medication with decongestant; most likely cause of elevation in BP  - Pt has BP machine at home and to continue monitor blood pressure  - Advised on s/s to seek emergent care for    Conjunctivitis Right Eye:  - Eye drops ordered  - Avoid touching or rubbing the eye  - Wash hands  - Warm compress to the eye(s)  - Advised on s/s to seek emergent care for  - f/u with PCP in the next 3 days for re-evaluation  - No contacts for one week and throw out all make-up used up till today to prevent re-infection

## 2025-01-06 NOTE — PROGRESS NOTES
"Subjective   Patient ID: Shilpi Barraza is a 64 y.o. female. They present today with a chief complaint of Sinus Problem (Ongoing 2-3 weeks ) and Difficulty Urinating (Started 2-3 days ago /\" Patient states she got a ct scan done and has a kidney stone\" ).    History of Present Illness  Pt presents to the  secondary to pain with urination x 2-3 days; cough x 2-3 weeks and redness and irritation to the right eye with discharge x 2 days. No vision changes or pain in the eye. She denies fever, sob, cp or pain with deep inspiration. No other associated symptoms or concerns to address at this time.       Sinus Problem  Difficulty Urinating      Past Medical History  Allergies as of 01/06/2025 - Reviewed 01/06/2025   Allergen Reaction Noted    Crestor [rosuvastatin] Myalgia 09/20/2024       (Not in a hospital admission)       Past Medical History:   Diagnosis Date    Encounter for gynecological examination (general) (routine) without abnormal findings     Pap test, as part of routine gynecological examination    Other conditions influencing health status     Mammogram normal       Past Surgical History:   Procedure Laterality Date    COLONOSCOPY      Colonoscopy    DILATION AND CURETTAGE OF UTERUS  04/13/2015    Dilation And Curettage    GALLBLADDER SURGERY  04/14/2015    Gallbladder Surgery    HYSTEROSCOPY  04/13/2015    Hysteroscopy With Endometrial Ablation    MOUTH SURGERY      Oral Surgery        reports that she has never smoked. She has never used smokeless tobacco. She reports that she does not drink alcohol and does not use drugs.    Review of Systems  Review of Systems   Genitourinary:  Positive for dysuria.        10 point ROS completed and all are negative other than what is stated in the current HPI                            Objective    Vitals:    01/06/25 1832 01/06/25 1850   BP: (!) 190/110 (!) 190/80   BP Location: Right arm Left arm   Patient Position: Sitting Sitting   Pulse: 99    Resp: 18    Temp: " "36.1 °C (97 °F)    SpO2: 99%    Weight: 77.1 kg (170 lb)    Height: 1.626 m (5' 4\")      No LMP recorded. Patient is postmenopausal.    Physical Exam  Vitals and nursing note reviewed.   Constitutional:       Appearance: Normal appearance.   HENT:      Nose: Congestion and rhinorrhea present.      Right Turbinates: Enlarged and swollen.      Left Turbinates: Enlarged and swollen.      Right Sinus: Maxillary sinus tenderness present.      Left Sinus: Maxillary sinus tenderness present.      Mouth/Throat:      Mouth: Mucous membranes are moist.      Comments: (+)postnasal discharge  Eyes:      General: Lids are normal. Vision grossly intact. Gaze aligned appropriately.         Right eye: Discharge present.         Left eye: No discharge.      Conjunctiva/sclera:      Right eye: Right conjunctiva is injected. Exudate present.      Left eye: Left conjunctiva is not injected. No exudate.  Cardiovascular:      Rate and Rhythm: Normal rate and regular rhythm.   Pulmonary:      Effort: Pulmonary effort is normal.      Breath sounds: Normal breath sounds.   Skin:     General: Skin is warm and dry.      Findings: No rash.   Neurological:      Mental Status: She is alert and oriented to person, place, and time.         Procedures    Point of Care Test & Imaging Results from this visit  Results for orders placed or performed in visit on 01/06/25   POCT UA Automated manually resulted   Result Value Ref Range    POC Color, Urine Yellow Straw, Yellow, Light-Yellow    POC Appearance, Urine Cloudy (A) Clear    POC Glucose, Urine NEGATIVE NEGATIVE mg/dl    POC Bilirubin, Urine NEGATIVE NEGATIVE    POC Ketones, Urine NEGATIVE NEGATIVE mg/dl    POC Specific Gravity, Urine 1.025 1.005 - 1.035    POC Blood, Urine LARGE (3+) (A) NEGATIVE    POC PH, Urine 6.0 No Reference Range Established PH    POC Protein, Urine TRACE (A) NEGATIVE mg/dl    POC Urobilinogen, Urine 0.2 0.2, 1.0 EU/DL    Poc Nitrite, Urine NEGATIVE NEGATIVE    POC " Leukocytes, Urine LARGE (3+) (A) NEGATIVE      No results found.    Diagnostic study results (if any) were reviewed by MERARY Gamboa.    Assessment/Plan   Allergies, medications, history, and pertinent labs/EKGs/Imaging reviewed by MERARY Gamboa.     Medical Decision Making  Dysuria:  -UA completed  -Good oral hydration; avoid holding urine  -C&S sent; will change abx if C&S suggestive  - Discussed UTI prevention methods with patient  -Advised on s/s to seek emergent care for  -f/u with PCP in the next week for re-evaluation of Urine  - Warm compress to back or lower abd if needed    Sinusitis:  - Good oral hydration  - Take abx and nasal spray as instructed  - Advised on s/s to seek emergent care for  - No erythema or edema to face  - Tylenol/Motrin as needed for pain or fever  - Kevon's vapor rub to chest; humidifier; warm showers/bath  -f/u with PCP in the next few days for re-evaluation    Hypertension (High Blood Pressure):  - Stop Taking cough medication with decongestant; most likely cause of elevation in BP  - Pt has BP machine at home and to continue monitor blood pressure  - Advised on s/s to seek emergent care for    Conjunctivitis Right Eye:  - Eye drops ordered  - Avoid touching or rubbing the eye  - Wash hands  - Warm compress to the eye(s)  - Advised on s/s to seek emergent care for  - f/u with PCP in the next 3 days for re-evaluation  - No contacts for one week and throw out all make-up used up till today to prevent re-infection    Orders and Diagnoses  Diagnoses and all orders for this visit:  Acute non-recurrent maxillary sinusitis  -     amoxicillin-pot clavulanate (Augmentin) 875-125 mg tablet; Take 1 tablet by mouth 2 times a day for 7 days.  Dysuria  -     POCT UA Automated manually resulted  -     Urine Culture  -     amoxicillin-pot clavulanate (Augmentin) 875-125 mg tablet; Take 1 tablet by mouth 2 times a day for 7 days.  Benign hypertension  Acute bacterial  conjunctivitis of right eye  -     polymyxin B sulf-trimethoprim (Polytrim) ophthalmic solution; Administer 1 drop into both eyes 4 times a day for 7 days.      Medical Admin Record      Patient disposition: Home    Electronically signed by MERARY Gamboa  7:01 PM

## 2025-01-09 LAB — BACTERIA UR CULT: ABNORMAL

## 2025-02-05 ENCOUNTER — APPOINTMENT (OUTPATIENT)
Dept: PRIMARY CARE | Facility: CLINIC | Age: 65
End: 2025-02-05
Payer: COMMERCIAL

## 2025-02-05 ENCOUNTER — APPOINTMENT (OUTPATIENT)
Dept: UROLOGY | Facility: CLINIC | Age: 65
End: 2025-02-05
Payer: COMMERCIAL

## 2025-02-05 VITALS
SYSTOLIC BLOOD PRESSURE: 159 MMHG | BODY MASS INDEX: 29.19 KG/M2 | WEIGHT: 171 LBS | DIASTOLIC BLOOD PRESSURE: 69 MMHG | TEMPERATURE: 97.1 F | HEIGHT: 64 IN | HEART RATE: 88 BPM

## 2025-02-05 DIAGNOSIS — N20.0 NEPHROLITHIASIS: Primary | ICD-10-CM

## 2025-02-05 DIAGNOSIS — R39.15 URGENCY OF URINATION: ICD-10-CM

## 2025-02-05 DIAGNOSIS — R39.89 BLADDER PAIN: ICD-10-CM

## 2025-02-05 DIAGNOSIS — R31.29 MICROSCOPIC HEMATURIA: ICD-10-CM

## 2025-02-05 DIAGNOSIS — R35.0 URINARY FREQUENCY: ICD-10-CM

## 2025-02-05 LAB
POC APPEARANCE, URINE: ABNORMAL
POC BILIRUBIN, URINE: NEGATIVE
POC BLOOD, URINE: ABNORMAL
POC COLOR, URINE: YELLOW
POC GLUCOSE, URINE: NEGATIVE MG/DL
POC KETONES, URINE: NEGATIVE MG/DL
POC LEUKOCYTES, URINE: ABNORMAL
POC NITRITE,URINE: NEGATIVE
POC PH, URINE: 5 PH
POC PROTEIN, URINE: NEGATIVE MG/DL
POC SPECIFIC GRAVITY, URINE: 1.02
POC UROBILINOGEN, URINE: 0.2 EU/DL

## 2025-02-05 PROCEDURE — 1036F TOBACCO NON-USER: CPT | Performed by: NURSE PRACTITIONER

## 2025-02-05 PROCEDURE — 3077F SYST BP >= 140 MM HG: CPT | Performed by: NURSE PRACTITIONER

## 2025-02-05 PROCEDURE — 51701 INSERT BLADDER CATHETER: CPT | Performed by: NURSE PRACTITIONER

## 2025-02-05 PROCEDURE — 99204 OFFICE O/P NEW MOD 45 MIN: CPT | Performed by: NURSE PRACTITIONER

## 2025-02-05 PROCEDURE — 81003 URINALYSIS AUTO W/O SCOPE: CPT | Performed by: NURSE PRACTITIONER

## 2025-02-05 PROCEDURE — 3008F BODY MASS INDEX DOCD: CPT | Performed by: NURSE PRACTITIONER

## 2025-02-05 PROCEDURE — 3078F DIAST BP <80 MM HG: CPT | Performed by: NURSE PRACTITIONER

## 2025-02-05 ASSESSMENT — PATIENT HEALTH QUESTIONNAIRE - PHQ9
2. FEELING DOWN, DEPRESSED OR HOPELESS: NOT AT ALL
SUM OF ALL RESPONSES TO PHQ9 QUESTIONS 1 AND 2: 0
1. LITTLE INTEREST OR PLEASURE IN DOING THINGS: NOT AT ALL

## 2025-02-05 NOTE — PATIENT INSTRUCTIONS
Microscopic hematuria, bladder pain, frequent urinary tract infections, urgency, frequency    CT Urogram, blood work one week prior (walk in)  Straight cath urine sent for micro reflux culture    Samples gemtesa given to take once daily  Discussed possible side effects    Cystoscopy with Dr. Hunt  Urine culture two weeks prior    Rosaline follow up 3-4 weeks  Nurse line [phone]

## 2025-02-05 NOTE — PROGRESS NOTES
Chief complaint: kidney stone    History Of Present Illness:   Ms. Shilpi Barraza is a 63 yo female presents for recent finding of kidney stone, noted on CT that was done d/t pelvic pain that began in October; urinary urgency, frequency and some UUI if she waits too long since October as well; urinating hourly since October, up 5 x at night, the pelvic pain low in her bladder; feels pressure to urinate, feels sometimes better after voiding, other times throbs;    No gross hematuria, has been treated for frequent UTIs lately; no vaginal bleeding;     Urine with large heme today;     UA large heme today, trace leuk  1/6/25 klebsiella + urine culture  12/12/24 klebsiella + urine culture  10/29/24 neg infection  10/14/24 klebsiella + urine culture  3/19/24 creatinine0.87, GFR 75    PVR 0 ml, today    12/27/24 CT abd/pel wo IV: KIDNEYS AND URETERS:  The kidneys are normal in size and unremarkable in appearance.  7 mm left inferior pole nonobstructing calculus without hydronephrosis. The urinary bladder is decompressed, limited for evaluation. No pelvic masses;     Past Medical History  She has a past medical history of Encounter for gynecological examination (general) (routine) without abnormal findings and Other conditions influencing health status.    Surgical History  She has a past surgical history that includes Dilation and curettage of uterus (04/13/2015); Mouth surgery; Hysteroscopy (04/13/2015); Colonoscopy; and Gallbladder surgery (04/14/2015).     Social History  She reports that she has never smoked. She has never used smokeless tobacco. She reports that she does not drink alcohol and does not use drugs.    Family History  Family History   Problem Relation Name Age of Onset    Breast cancer Mother          Allergies  Crestor [rosuvastatin]    ROS: 12 system review was completed and is negative with the exception of those signs and symptoms noted in the history of present illness: A 12 system review was  "completed and is negative with the exception of those signs and symptoms noted in the history of present illness.     Exam:  General: in NAD, appears stated age  Head: normocephalic, atraumatic  Respiratory: normal effort, no use of accessory muscles  Cardiovascular: no edema noted  Skin: normal turgor, no rashes  Neurologic: grossly intact, oriented to person/place/time  Psychiatric: mode and affect appropriate    No vulvar lesions, neg Qtip tenderness, mod atrophy  Neg CST lying down, Neg levator ani tenderness or tightness  Stage I-II small cystocele, no rectocele or uterine prolapse; Non tender ovaries/uterus, difficult exam d/t  body habitus   No rectal exam done       Last Recorded Vitals  Blood pressure 159/69, pulse 88, temperature 36.2 °C (97.1 °F), height 1.626 m (5' 4\"), weight 77.6 kg (171 lb).    Lab Results   Component Value Date    CREATININE 0.87 03/19/2024    HGB 13.2 02/16/2023         ASSESSMENT/PLAN:  Microscopic hematuria  Frequent Urinary tract infections  Urinary frequency  Urgency of urination  Nephrolithiasis  Bladder pain    MERARY Zhong    Microscopic hematuria, bladder pain, frequent urinary tract infections, urgency, frequency    CT Urogram, blood work one week prior (walk in)  Straight cath urine sent for micro reflux culture    Cystoscopy with Dr. Hunt  Urine culture two weeks prior    Rosaline follow up 3-4 weeks    1. CT urogram   2. Cystoscopy with Dr. Hunt 4-5 weeks  3. Rosaline follow up 3-4 weeksPatient ID: Shilpi Barraza is a 64 y.o. female.    Bladder Catheterization    Date/Time: 2/5/2025 9:03 AM    Performed by: MERARY Zhong  Authorized by: MERARY Zhong    Procedure Details    Procedure: catheter insertion      Catheter insertion: non-indwelling (straight)      Catheter size: 14 Fr    Urine characteristics: cloudy    Post-Procedure Details     Outcome: patient tolerated procedure well with no complications                "

## 2025-02-06 DIAGNOSIS — N30.00 ACUTE CYSTITIS WITHOUT HEMATURIA: Primary | ICD-10-CM

## 2025-02-06 RX ORDER — NITROFURANTOIN 25; 75 MG/1; MG/1
100 CAPSULE ORAL 2 TIMES DAILY
Qty: 14 CAPSULE | Refills: 0 | Status: SHIPPED | OUTPATIENT
Start: 2025-02-06 | End: 2025-02-13

## 2025-02-07 LAB
APPEARANCE UR: ABNORMAL
BACTERIA #/AREA URNS HPF: ABNORMAL /HPF
BACTERIA UR CULT: ABNORMAL
BILIRUB UR QL STRIP: NEGATIVE
COLOR UR: YELLOW
GLUCOSE UR QL STRIP: NEGATIVE
HGB UR QL STRIP: ABNORMAL
HYALINE CASTS #/AREA URNS LPF: ABNORMAL /LPF
KETONES UR QL STRIP: NEGATIVE
LEUKOCYTE ESTERASE UR QL STRIP: ABNORMAL
NITRITE UR QL STRIP: NEGATIVE
PH UR STRIP: ABNORMAL [PH] (ref 5–8)
PROT UR QL STRIP: NEGATIVE
RBC #/AREA URNS HPF: ABNORMAL /HPF
SERVICE CMNT-IMP: ABNORMAL
SP GR UR STRIP: 1.02 (ref 1–1.03)
SQUAMOUS #/AREA URNS HPF: ABNORMAL /HPF
WBC #/AREA URNS HPF: ABNORMAL /HPF

## 2025-02-10 DIAGNOSIS — R31.29 MICROSCOPIC HEMATURIA: ICD-10-CM

## 2025-02-10 DIAGNOSIS — R35.0 URINARY FREQUENCY: ICD-10-CM

## 2025-02-10 DIAGNOSIS — N30.00 ACUTE CYSTITIS WITHOUT HEMATURIA: Primary | ICD-10-CM

## 2025-02-10 RX ORDER — CIPROFLOXACIN 500 MG/1
500 TABLET ORAL 2 TIMES DAILY
Qty: 14 TABLET | Refills: 0 | Status: SHIPPED | OUTPATIENT
Start: 2025-02-10 | End: 2025-02-17

## 2025-02-12 ENCOUNTER — HOSPITAL ENCOUNTER (OUTPATIENT)
Dept: RADIOLOGY | Facility: CLINIC | Age: 65
Discharge: HOME | End: 2025-02-12
Payer: COMMERCIAL

## 2025-02-12 DIAGNOSIS — R39.89 BLADDER PAIN: ICD-10-CM

## 2025-02-12 DIAGNOSIS — N20.0 NEPHROLITHIASIS: ICD-10-CM

## 2025-02-12 DIAGNOSIS — R31.29 MICROSCOPIC HEMATURIA: ICD-10-CM

## 2025-02-12 DIAGNOSIS — R35.0 URINARY FREQUENCY: ICD-10-CM

## 2025-02-12 LAB
CREAT SERPL-MCNC: 0.98 MG/DL (ref 0.5–1.05)
EGFRCR SERPLBLD CKD-EPI 2021: 64 ML/MIN/1.73M2

## 2025-02-12 PROCEDURE — 74178 CT ABD&PLV WO CNTR FLWD CNTR: CPT | Performed by: RADIOLOGY

## 2025-02-12 PROCEDURE — 2550000001 HC RX 255 CONTRASTS: Performed by: NURSE PRACTITIONER

## 2025-02-12 PROCEDURE — 76377 3D RENDER W/INTRP POSTPROCES: CPT

## 2025-02-12 PROCEDURE — 76377 3D RENDER W/INTRP POSTPROCES: CPT | Performed by: RADIOLOGY

## 2025-02-12 RX ADMIN — IOHEXOL 75 ML: 350 INJECTION, SOLUTION INTRAVENOUS at 15:06

## 2025-02-13 LAB
APPEARANCE UR: ABNORMAL
BACTERIA #/AREA URNS HPF: ABNORMAL /HPF
BACTERIA UR CULT: ABNORMAL
BACTERIA UR CULT: ABNORMAL
BILIRUB UR QL STRIP: NEGATIVE
COLOR UR: YELLOW
GLUCOSE UR QL STRIP: NEGATIVE
HGB UR QL STRIP: ABNORMAL
HYALINE CASTS #/AREA URNS LPF: ABNORMAL /LPF
KETONES UR QL STRIP: NEGATIVE
LEUKOCYTE ESTERASE UR QL STRIP: ABNORMAL
NITRITE UR QL STRIP: NEGATIVE
PH UR STRIP: ABNORMAL [PH] (ref 5–8)
PROT UR QL STRIP: ABNORMAL
RBC #/AREA URNS HPF: ABNORMAL /HPF
SERVICE CMNT-IMP: ABNORMAL
SP GR UR STRIP: 1.02 (ref 1–1.03)
SQUAMOUS #/AREA URNS HPF: ABNORMAL /HPF
WBC #/AREA URNS HPF: ABNORMAL /HPF

## 2025-02-19 ENCOUNTER — APPOINTMENT (OUTPATIENT)
Dept: RADIOLOGY | Facility: HOSPITAL | Age: 65
End: 2025-02-19
Payer: COMMERCIAL

## 2025-02-24 ENCOUNTER — APPOINTMENT (OUTPATIENT)
Dept: PRIMARY CARE | Facility: CLINIC | Age: 65
End: 2025-02-24
Payer: COMMERCIAL

## 2025-02-24 ENCOUNTER — PHARMACY VISIT (OUTPATIENT)
Dept: PHARMACY | Facility: CLINIC | Age: 65
End: 2025-02-24
Payer: COMMERCIAL

## 2025-02-24 VITALS
OXYGEN SATURATION: 99 % | BODY MASS INDEX: 29.18 KG/M2 | DIASTOLIC BLOOD PRESSURE: 63 MMHG | TEMPERATURE: 96 F | HEIGHT: 64 IN | HEART RATE: 80 BPM | SYSTOLIC BLOOD PRESSURE: 117 MMHG | RESPIRATION RATE: 16 BRPM | WEIGHT: 170.9 LBS

## 2025-02-24 DIAGNOSIS — L65.9 HAIR THINNING: ICD-10-CM

## 2025-02-24 DIAGNOSIS — R73.9 HYPERGLYCEMIA: ICD-10-CM

## 2025-02-24 DIAGNOSIS — Z71.89 CARDIAC RISK COUNSELING: ICD-10-CM

## 2025-02-24 DIAGNOSIS — N39.0 URINARY TRACT INFECTION WITHOUT HEMATURIA, SITE UNSPECIFIED: Primary | ICD-10-CM

## 2025-02-24 DIAGNOSIS — I10 PRIMARY HYPERTENSION: ICD-10-CM

## 2025-02-24 DIAGNOSIS — E78.5 HYPERLIPIDEMIA, UNSPECIFIED HYPERLIPIDEMIA TYPE: ICD-10-CM

## 2025-02-24 LAB
POC APPEARANCE, URINE: ABNORMAL
POC BILIRUBIN, URINE: NEGATIVE
POC BLOOD, URINE: ABNORMAL
POC COLOR, URINE: YELLOW
POC GLUCOSE, URINE: NEGATIVE MG/DL
POC KETONES, URINE: NEGATIVE MG/DL
POC LEUKOCYTES, URINE: ABNORMAL
POC NITRITE,URINE: NEGATIVE
POC PH, URINE: 5.5 PH
POC PROTEIN, URINE: ABNORMAL MG/DL
POC SPECIFIC GRAVITY, URINE: 1.02
POC UROBILINOGEN, URINE: 0.2 EU/DL

## 2025-02-24 PROCEDURE — 3008F BODY MASS INDEX DOCD: CPT | Performed by: PHYSICIAN ASSISTANT

## 2025-02-24 PROCEDURE — RXMED WILLOW AMBULATORY MEDICATION CHARGE

## 2025-02-24 PROCEDURE — 3078F DIAST BP <80 MM HG: CPT | Performed by: PHYSICIAN ASSISTANT

## 2025-02-24 PROCEDURE — 3074F SYST BP LT 130 MM HG: CPT | Performed by: PHYSICIAN ASSISTANT

## 2025-02-24 PROCEDURE — 99213 OFFICE O/P EST LOW 20 MIN: CPT | Performed by: PHYSICIAN ASSISTANT

## 2025-02-24 PROCEDURE — 81003 URINALYSIS AUTO W/O SCOPE: CPT | Performed by: PHYSICIAN ASSISTANT

## 2025-02-24 PROCEDURE — 1036F TOBACCO NON-USER: CPT | Performed by: PHYSICIAN ASSISTANT

## 2025-02-24 RX ORDER — AMOXICILLIN AND CLAVULANATE POTASSIUM 875; 125 MG/1; MG/1
875 TABLET, FILM COATED ORAL
Qty: 20 TABLET | Refills: 0 | Status: SHIPPED | OUTPATIENT
Start: 2025-02-24 | End: 2025-03-06

## 2025-02-24 RX ORDER — LOSARTAN POTASSIUM AND HYDROCHLOROTHIAZIDE 12.5; 1 MG/1; MG/1
1 TABLET ORAL DAILY
Qty: 90 TABLET | Refills: 1 | Status: SHIPPED | OUTPATIENT
Start: 2025-02-24 | End: 2025-08-23

## 2025-02-24 ASSESSMENT — ENCOUNTER SYMPTOMS
FEVER: 0
FLANK PAIN: 0
SHORTNESS OF BREATH: 0
HEMATURIA: 0
CHILLS: 1
FATIGUE: 0
FREQUENCY: 1

## 2025-02-24 NOTE — PROGRESS NOTES
"Subjective   Patient ID: Shilpi Barraza is a 64 y.o. female who presents for 6 month follow up (HTN) and UTI (Burning, pressure and pain when urinate).    HPI     HTN:   - BP today: 117/64  - Current meds: losartan and hydrochlorothiazide   - Compliant: yes  - Monitoring BP at home: yes  - Caffeine intake: squirt of energy flavoring in the morning  - NSAID use: takes ibuprofen prn due to stone pain, rarely needs it  - Sodium intake: tries to limit  - Activity level: not currently   - Complications?: Pt denies orthostatic hypotension, chest pains, palpitations, dizziness, SOB, lower extremity edema     UTI sxs:   - Onset: Friday (3 days ago)  - Associated sxs: burning when urinating, urinary frequency, urinary urgency, stress incontinence, suprapubic pain, nauseas when pain occurs  - Denies fever, Vomiting, gross hematuria  - History of recurrent UTIs: yes, most recent in 1/6/2025, abx help for abt a week then they reoccur  - Denies vaginal itching/dryness  - Recent UTI:   - Recent Antibiotics?:  ciprofloxacin    Ongoing nephrolithiasis, has not passed the stone     Review of Systems   Constitutional:  Positive for chills. Negative for fatigue and fever.   Respiratory:  Negative for shortness of breath.    Cardiovascular:  Negative for chest pain.   Genitourinary:  Positive for frequency, pelvic pain and urgency. Negative for flank pain, hematuria, vaginal bleeding and vaginal discharge.       Objective   /63   Pulse 80   Temp 35.6 °C (96 °F) (Temporal)   Resp 16   Ht 1.626 m (5' 4\")   Wt 77.5 kg (170 lb 14.4 oz)   SpO2 99%   BMI 29.33 kg/m²     Physical Exam  Constitutional:       Appearance: Normal appearance.   Cardiovascular:      Rate and Rhythm: Normal rate and regular rhythm.      Pulses: Normal pulses.      Heart sounds: Normal heart sounds. No murmur heard.  Pulmonary:      Effort: Pulmonary effort is normal.      Breath sounds: Normal breath sounds.   Abdominal:      General: Abdomen is flat. " There is no distension.      Palpations: Abdomen is soft. There is no mass.      Tenderness: There is no abdominal tenderness.   Musculoskeletal:      Right lower leg: No edema.      Left lower leg: No edema.   Neurological:      Mental Status: She is alert.   Psychiatric:         Mood and Affect: Mood and affect normal.         Assessment/Plan     Problem List Items Addressed This Visit       Hyperglycemia    Relevant Orders    Hemoglobin A1C    Hyperlipidemia    Overview     - Most recent labs: Total chol 238, LDL incalculable, HLD 36.0, trigs 516 (3/19/24)  - Current meds: OTC fish oil and niacin supplements   - Historical med: atorvastatin 40 mg (muscle cramps), rosuvastatin (muscle cramps)          Current Assessment & Plan     - Taking Crestor every other day because gets myalgia with daily dosing   - Recheck lipid panel next month along with apoB and LPa for cardiac risk stratification          Relevant Orders    Lipid Panel    Apolipoprotein B    Lipoprotein a    Primary hypertension    Overview     - Current meds: losartan-hydrochlorothiazide 100-12.5 mg          Current Assessment & Plan     - BP is good with higher dose   - Continue current tx plan          Relevant Medications    losartan-hydrochlorothiazide (Hyzaar) 100-12.5 mg tablet    Other Relevant Orders    Comprehensive Metabolic Panel     Other Visit Diagnoses       Urinary tract infection without hematuria, site unspecified    -  Primary    Relevant Medications    amoxicillin-pot clavulanate (Augmentin) 875-125 mg tablet    Other Relevant Orders    POCT UA Automated manually resulted (Completed)    Urine Culture    Cardiac risk counseling        Relevant Orders    Apolipoprotein B    Lipoprotein a    Hair thinning        Relevant Orders    TSH with reflex to Free T4 if abnormal

## 2025-02-24 NOTE — ASSESSMENT & PLAN NOTE
- Taking Crestor every other day because gets myalgia with daily dosing   - Recheck lipid panel next month along with apoB and LPa for cardiac risk stratification

## 2025-02-27 ENCOUNTER — PHARMACY VISIT (OUTPATIENT)
Dept: PHARMACY | Facility: CLINIC | Age: 65
End: 2025-02-27
Payer: COMMERCIAL

## 2025-02-28 LAB — BACTERIA UR CULT: ABNORMAL

## 2025-03-04 DIAGNOSIS — R10.13 DYSPEPSIA: ICD-10-CM

## 2025-03-04 DIAGNOSIS — B00.1 COLD SORE: ICD-10-CM

## 2025-03-04 DIAGNOSIS — K21.9 GASTROESOPHAGEAL REFLUX DISEASE, UNSPECIFIED WHETHER ESOPHAGITIS PRESENT: ICD-10-CM

## 2025-03-05 ENCOUNTER — APPOINTMENT (OUTPATIENT)
Dept: UROLOGY | Facility: CLINIC | Age: 65
End: 2025-03-05
Payer: COMMERCIAL

## 2025-03-05 VITALS — TEMPERATURE: 97.7 F | HEART RATE: 89 BPM | DIASTOLIC BLOOD PRESSURE: 68 MMHG | SYSTOLIC BLOOD PRESSURE: 114 MMHG

## 2025-03-05 DIAGNOSIS — R39.89 BLADDER PAIN: ICD-10-CM

## 2025-03-05 DIAGNOSIS — R31.29 MICROSCOPIC HEMATURIA: ICD-10-CM

## 2025-03-05 DIAGNOSIS — N95.8 GENITOURINARY SYNDROME OF MENOPAUSE: ICD-10-CM

## 2025-03-05 DIAGNOSIS — R35.0 URINARY FREQUENCY: Primary | ICD-10-CM

## 2025-03-05 LAB
POC APPEARANCE, URINE: CLEAR
POC BILIRUBIN, URINE: NEGATIVE
POC BLOOD, URINE: ABNORMAL
POC COLOR, URINE: YELLOW
POC GLUCOSE, URINE: NEGATIVE MG/DL
POC KETONES, URINE: NEGATIVE MG/DL
POC LEUKOCYTES, URINE: ABNORMAL
POC NITRITE,URINE: NEGATIVE
POC PH, URINE: 5 PH
POC PROTEIN, URINE: NEGATIVE MG/DL
POC SPECIFIC GRAVITY, URINE: 1.02
POC UROBILINOGEN, URINE: 0.2 EU/DL

## 2025-03-05 PROCEDURE — 81003 URINALYSIS AUTO W/O SCOPE: CPT | Performed by: NURSE PRACTITIONER

## 2025-03-05 PROCEDURE — 3074F SYST BP LT 130 MM HG: CPT | Performed by: NURSE PRACTITIONER

## 2025-03-05 PROCEDURE — 3078F DIAST BP <80 MM HG: CPT | Performed by: NURSE PRACTITIONER

## 2025-03-05 PROCEDURE — RXMED WILLOW AMBULATORY MEDICATION CHARGE

## 2025-03-05 PROCEDURE — 99214 OFFICE O/P EST MOD 30 MIN: CPT | Performed by: NURSE PRACTITIONER

## 2025-03-05 RX ORDER — VALACYCLOVIR HYDROCHLORIDE 1 G/1
1000 TABLET, FILM COATED ORAL DAILY
Qty: 90 TABLET | Refills: 0 | Status: SHIPPED | OUTPATIENT
Start: 2025-03-05 | End: 2026-03-05

## 2025-03-05 RX ORDER — ESTRADIOL 0.1 MG/G
CREAM VAGINAL
Qty: 42.5 G | Refills: 5 | Status: SHIPPED | OUTPATIENT
Start: 2025-03-05 | End: 2026-03-05

## 2025-03-05 RX ORDER — OMEPRAZOLE 40 MG/1
40 CAPSULE, DELAYED RELEASE ORAL DAILY
Qty: 90 CAPSULE | Refills: 0 | Status: SHIPPED | OUTPATIENT
Start: 2025-03-05 | End: 2026-03-05

## 2025-03-05 RX ORDER — TROSPIUM CHLORIDE 20 MG/1
20 TABLET, FILM COATED ORAL 2 TIMES DAILY
Qty: 60 TABLET | Refills: 11 | Status: SHIPPED | OUTPATIENT
Start: 2025-03-05 | End: 2026-03-05

## 2025-03-05 NOTE — PROGRESS NOTES
03/05/25   [id number]    Chief Complaint   Patient presents with    CT scan results      Med response, bladder pain, microscopic hematuria, UTIs    Subjective      HPI Shilpi Barraza is a 64 y.o. female who presents for follow up CT results, microscopic hematuria, UTIs, bladder pain;  med response Gemtesa.    Last visit 2/5/25 micro from straight cath, rbc > 60/hpf, wbc 20-40/hpf, treated for klebsiella + urine culture; 2/24/25 treated for enterococcus + urine culture by Toña Mariano PA-C    Taking the gemtesa but waking up hourly at night with pressure and pain in bladder; no gross hematuria; will trial trospium, sometimes hourly during the day, other times not as bad;     Scheduled for cystoscopy with Dr. Hunt on 3/13/25;     CT Urogram 2/12/25: IMPRESSION:  NO NEW ACUTE FINDING IN THE URINARY TRACT, ONLY THE UNCHANGED 7-8 MM  HIGH ATTENUATION NONOBSTRUCTING LOWER POLE LEFT RENAL STONE, STILL  THE ONLY STONE IN THE ENTIRE URINARY TRACT      NO OBSTRUCTING/OFFENDING STONE. NO STONE OR STONE FRAGMENT IN THE  RIGHT KIDNEY, EITHER URETER OR THE URINARY BLADDER      NO HYDROURETERONEPHROSIS ON EITHER SIDE      NO EVIDENCE OF ACUTE INFLAMMATION ANYWHERE IN THE URINARY TRACT      NO SOLID RENAL PARENCHYMAL MASS      NO EVIDENCE OF UROTHELIAL LESION IN THE OPACIFIED URINARY TRACT,  NOTING THAT THE FOLLOWING WAS / WERE NOT WELL ENOUGH OPACIFIED WITH  EXCRETED CONTRAST DURING THE EXCRETORY PHASE OF TODAY'S EXAM TO  EVALUATE DIRECTLY: THE NONDEPENDENT ONE HALF OF THE URINARY BLADDER;  SCATTERED SHORT SEGMENTS OF THE RIGHT URETER; THE DISTAL 2/3 OF THE  LEFT URETER.  OF THE PORTIONS OF THE URETERS NOT WELL ENOUGH  OPACIFIED TO EVALUATE DIRECTLY, NONE WAS PARTICULARLY EXPANSILE TO  SUGGEST AND UNDERLYING SUBSTANTIAL SIZED OCCULT UROTHELIAL LESION      NO VARIANT ANATOMY SUCH AS URACHAL REMNANT OR DUPLICATED/ECTOPIC  URETERS      NO ACUTE UNANTICIPATED PROCESS IN THE ABDOMEN OR PELVIS OUTSIDE THE  URINARY TRACT       MACRO:  None      Recap last visit 2/5/25  recent finding of kidney stone, noted on CT that was done d/t pelvic pain that began in October; urinary urgency, frequency and some UUI if she waits too long since October as well; urinating hourly since October, up 5 x at night, the pelvic pain low in her bladder; feels pressure to urinate, feels sometimes better after voiding, other times throbs;     No gross hematuria, has been treated for frequent UTIs lately; no vaginal bleeding;      Urine with large heme today;      UA large heme today, trace leuk  1/6/25 klebsiella + urine culture  12/12/24 klebsiella + urine culture  10/29/24 neg infection  10/14/24 klebsiella + urine culture  3/19/24 creatinine0.87, GFR 75     PVR 0 ml, today     12/27/24 CT abd/pel wo IV: KIDNEYS AND URETERS:  The kidneys are normal in size and unremarkable in appearance.  7 mm left inferior pole nonobstructing calculus without hydronephrosis. The urinary bladder is decompressed, limited for evaluation. No pelvic masses;     2/5/25 No vulvar lesions, neg Qtip tenderness, mod atrophy; Neg CST lying down, Neg levator ani tenderness or tightness  Stage I-II small cystocele, no rectocele or uterine prolapse; Non tender ovaries/uterus, difficult exam d/t  body habitus   No rectal exam done       Objective     /68   Pulse 89   Temp 36.5 °C (97.7 °F)    Physical Exam  General: Appears comfortable and in no apparent distress, well nourished  Head: Normocephalic, atraumatic  Neck: trachea midline  Respiratory: respirations unlabored, no wheezes, and no use of accessory muscles  Cardiovascular: at rest no dyspnea, well perfused  Skin: no visible rashes or lesions  Neurologic: grossly intact, oriented to person, place, and time  Psychiatric: mood and affect appropriate  Musculoskeletal: in chair for appt. no difficulty w upper body movement    Assessment/Plan   Problem List Items Addressed This Visit    None  Visit Diagnoses       Urinary  frequency    -  Primary    Relevant Medications    trospium (Sanctura) 20 mg tablet    Other Relevant Orders    POCT UA Automated manually resulted (Completed)    Urine Culture    Genitourinary syndrome of menopause        Relevant Medications    estradiol (Estrace) 0.01 % (0.1 mg/gram) vaginal cream    Microscopic hematuria        Relevant Orders    Urine Culture    Bladder pain        Relevant Medications    trospium (Sanctura) 20 mg tablet          Orders Placed This Encounter   Procedures    Urine Culture     Order Specific Question:   Release result to MyChart     Answer:   Immediate [1]    POCT UA Automated manually resulted     Order Specific Question:   Release result to MyChart     Answer:   Immediate [1]      Dmanose concentrated cranberry supplement 2000 mg (amazon)  Estrogen vaginal cream     Urine culture sen today, scheduled for cystoscopy with Dr. Hunt on 3/13/25    Trospium 20 mg twice daily, can stop gemtesa if not helping    Discussed possible side effects: Prevent constipation, prunes, stool softener, psyllium fiber, dry mouth Biotene if needed     Will make appt. With me after cystoscopy is completed, already scheduled for 3/13/25    Large kidney stone, can talk further w Dr. Hunt about procedure   Rosaline Cheema, APRN-CNP  Lab Results   Component Value Date    GLUCOSE 108 (H) 03/19/2024    CALCIUM 9.6 03/19/2024     03/19/2024    K 4.2 03/19/2024    CO2 31 03/19/2024     03/19/2024    BUN 16 03/19/2024    CREATININE 0.98 02/11/2025

## 2025-03-05 NOTE — PATIENT INSTRUCTIONS
Dmanose concentrated cranberry supplement 2000 mg (amazon)  Estrogen vaginal cream     Urine culture, scheduled for cystoscopy with Dr. Hunt on 3/13/25    Trospium 20 mg twice daily, can stop gemtesa if not helping  Discussed possible side effects: Prevent constipation, prunes, stool softener, psyllium fiber, dry mouth Biotene if needed

## 2025-03-07 ENCOUNTER — TELEPHONE (OUTPATIENT)
Dept: UROLOGY | Facility: CLINIC | Age: 65
End: 2025-03-07
Payer: COMMERCIAL

## 2025-03-07 ENCOUNTER — PHARMACY VISIT (OUTPATIENT)
Dept: PHARMACY | Facility: CLINIC | Age: 65
End: 2025-03-07
Payer: COMMERCIAL

## 2025-03-07 DIAGNOSIS — R31.29 MICROSCOPIC HEMATURIA: ICD-10-CM

## 2025-03-07 DIAGNOSIS — R35.0 URINARY FREQUENCY: ICD-10-CM

## 2025-03-07 LAB — BACTERIA UR CULT: NORMAL

## 2025-03-07 NOTE — TELEPHONE ENCOUNTER
----- Message from Rosaline Cheema sent at 3/7/2025  6:21 AM EST -----  No infeciton  ----- Message -----  From: Sujey Sanchez MA  Sent: 3/5/2025  10:40 AM EST  To: DONNA Zhong-CNP

## 2025-03-11 RX ORDER — LIDOCAINE HYDROCHLORIDE 20 MG/ML
1 JELLY TOPICAL ONCE
Status: COMPLETED | OUTPATIENT
Start: 2025-03-13 | End: 2025-03-13

## 2025-03-13 ENCOUNTER — APPOINTMENT (OUTPATIENT)
Dept: UROLOGY | Facility: CLINIC | Age: 65
End: 2025-03-13
Payer: COMMERCIAL

## 2025-03-13 ENCOUNTER — PREP FOR PROCEDURE (OUTPATIENT)
Dept: UROLOGY | Facility: HOSPITAL | Age: 65
End: 2025-03-13

## 2025-03-13 VITALS — HEART RATE: 86 BPM | SYSTOLIC BLOOD PRESSURE: 159 MMHG | DIASTOLIC BLOOD PRESSURE: 74 MMHG | TEMPERATURE: 96.3 F

## 2025-03-13 DIAGNOSIS — R31.29 MICROSCOPIC HEMATURIA: ICD-10-CM

## 2025-03-13 DIAGNOSIS — N32.9 LESION OF BLADDER: Primary | ICD-10-CM

## 2025-03-13 LAB
POC APPEARANCE, URINE: CLEAR
POC BILIRUBIN, URINE: NEGATIVE
POC BLOOD, URINE: ABNORMAL
POC COLOR, URINE: YELLOW
POC GLUCOSE, URINE: NEGATIVE MG/DL
POC KETONES, URINE: NEGATIVE MG/DL
POC LEUKOCYTES, URINE: ABNORMAL
POC NITRITE,URINE: NEGATIVE
POC PH, URINE: 5.5 PH
POC PROTEIN, URINE: NEGATIVE MG/DL
POC SPECIFIC GRAVITY, URINE: 1.02
POC UROBILINOGEN, URINE: 0.2 EU/DL

## 2025-03-13 PROCEDURE — 99213 OFFICE O/P EST LOW 20 MIN: CPT | Performed by: UROLOGY

## 2025-03-13 PROCEDURE — 52000 CYSTOURETHROSCOPY: CPT | Performed by: UROLOGY

## 2025-03-13 PROCEDURE — 81003 URINALYSIS AUTO W/O SCOPE: CPT | Performed by: UROLOGY

## 2025-03-13 RX ADMIN — LIDOCAINE HYDROCHLORIDE 1 APPLICATION: 20 JELLY TOPICAL at 08:00

## 2025-03-13 NOTE — H&P (VIEW-ONLY)
Subjective   Patient ID: Shilpi Barraza is a 64 y.o. female     HPI  64 y.o.  patient presenting as a referral from Rosaline alba with complaints of pelvic pain, microscopic hematuria and recurrent UTIs presenting for cystoscopic evaluation today 3/13/2025    The patient presents with bothersome pelvic pain and recurrent UTIs. She had a sense of pressure when she sits down, her pain is more inside the vagina for the past 7 months. Pain increases with walking, the pain bothers her daily but it worse on someday's. She does not find relief, When her bladder is full she notes worsens her pain. At times it is cram py.  She notes 4-5 episodes of nocturia but denies any enuresis. She voids every hour during the day. She does leaking on laughing, cough or sneezing but note with urgency.     Her UTIs started in October 2024 : 2/24/2025 enterococcus faecalis, 2/5/2025 Klebsiella, 1/6/25 klebsiella + urine culture, 12/12/24 klebsiella + urine culture and 10/14/24 klebsiella + urine culture.    She has a known 7 mm kidney stone in the lower pole of her left kidney.     She is sexually active but denies any vaginal complaints, no abnormal vaginal bleeding or discharge. She denies any vaginal dryness or irritation. She denies any bulge complaints, no pressure or pulling.    She has no other complaints.     Last seen by rosaline alba:  Lexy Barraza is a 64 y.o. female who presents for follow up CT results, microscopic hematuria, UTIs, bladder pain;  med response Gemtesa.    Last visit 2/5/25 micro from straight cath, rbc > 60/hpf, wbc 20-40/hpf, treated for klebsiella + urine culture; 2/24/25 treated for enterococcus + urine culture by Toña Mariano PA-C  Taking the gemtesa but waking up hourly at night with pressure and pain in bladder; no gross hematuria; will trial trospium, sometimes hourly during the day, other times not as bad;   Scheduled for cystoscopy with Dr. Hunt on 3/13/25;  Dmanose concentrated cranberry supplement  2000 mg (amazon)  Estrogen vaginal cream     Urine culture sen today, scheduled for cystoscopy with Dr. Hunt on 3/13/25    Trospium 20 mg twice daily, can stop gemtesa if not helping    Discussed possible side effects: Prevent constipation, prunes, stool softener, psyllium fiber, dry mouth Biotene if needed     Will make appt. With me after cystoscopy is completed, already scheduled for 3/13/25    Large kidney stone, can talk further w Dr. Hunt about procedure     Recap last visit 2/5/25  recent finding of kidney stone, noted on CT that was done d/t pelvic pain that began in October; urinary urgency, frequency and some UUI if she waits too long since October as well; urinating hourly since October, up 5 x at night, the pelvic pain low in her bladder; feels pressure to urinate, feels sometimes better after voiding, other times throbs;     No gross hematuria, has been treated for frequent UTIs lately; no vaginal bleeding;      Urine with large heme today;      UA large heme today, trace leuk  1/6/25 klebsiella + urine culture  12/12/24 klebsiella + urine culture  10/29/24 neg infection  10/14/24 klebsiella + urine culture  3/19/24 creatinine0.87, GFR 75     PVR 0 ml, today     12/27/24 CT abd/pel wo IV: KIDNEYS AND URETERS:  The kidneys are normal in size and unremarkable in appearance.  7 mm left inferior pole nonobstructing calculus without hydronephrosis. The urinary bladder is decompressed, limited for evaluation. No pelvic masses;     2/5/25 No vulvar lesions, neg Qtip tenderness, mod atrophy; Neg CST lying down, Neg levator ani tenderness or tightness  Stage I-II small cystocele, no rectocele or uterine prolapse; Non tender ovaries/uterus, difficult exam d/t  body habitus   No rectal exam done    Review of Systems  PHYSICAL EXAMINATION:  No LMP recorded. Patient is postmenopausal.  There is no height or weight on file to calculate BMI.  Visit Vitals  /74   Pulse 86   Temp 35.7 °C (96.3 °F)   OB Status  Postmenopausal   Smoking Status Never     General Appearance: well appearing  Neuro: Alert and oriented       Urine dip:   Recent Results (from the past 6 hours)   POCT UA Automated manually resulted    Collection Time: 03/13/25  8:18 AM   Result Value Ref Range    POC Color, Urine Yellow Straw, Yellow, Light-Yellow    POC Appearance, Urine Clear Clear    POC Glucose, Urine NEGATIVE NEGATIVE mg/dl    POC Bilirubin, Urine NEGATIVE NEGATIVE    POC Ketones, Urine NEGATIVE NEGATIVE mg/dl    POC Specific Gravity, Urine 1.025 1.005 - 1.035    POC Blood, Urine TRACE-Intact (A) NEGATIVE    POC PH, Urine 5.5 No Reference Range Established PH    POC Protein, Urine NEGATIVE NEGATIVE mg/dl    POC Urobilinogen, Urine 0.2 0.2, 1.0 EU/DL    Poc Nitrite, Urine NEGATIVE NEGATIVE    POC Leukocytes, Urine SMALL (1+) (A) NEGATIVE     Imaging and results:  CT Urogram 2/12/25: IMPRESSION:  NO NEW ACUTE FINDING IN THE URINARY TRACT, ONLY THE UNCHANGED 7-8 MM  HIGH ATTENUATION NONOBSTRUCTING LOWER POLE LEFT RENAL STONE, STILL  THE ONLY STONE IN THE ENTIRE URINARY TRACT      NO OBSTRUCTING/OFFENDING STONE. NO STONE OR STONE FRAGMENT IN THE  RIGHT KIDNEY, EITHER URETER OR THE URINARY BLADDER      NO HYDROURETERONEPHROSIS ON EITHER SIDE      NO EVIDENCE OF ACUTE INFLAMMATION ANYWHERE IN THE URINARY TRACT      NO SOLID RENAL PARENCHYMAL MASS      NO EVIDENCE OF UROTHELIAL LESION IN THE OPACIFIED URINARY TRACT,  NOTING THAT THE FOLLOWING WAS / WERE NOT WELL ENOUGH OPACIFIED WITH  EXCRETED CONTRAST DURING THE EXCRETORY PHASE OF TODAY'S EXAM TO  EVALUATE DIRECTLY: THE NONDEPENDENT ONE HALF OF THE URINARY BLADDER;  SCATTERED SHORT SEGMENTS OF THE RIGHT URETER; THE DISTAL 2/3 OF THE  LEFT URETER.  OF THE PORTIONS OF THE URETERS NOT WELL ENOUGH  OPACIFIED TO EVALUATE DIRECTLY, NONE WAS PARTICULARLY EXPANSILE TO  SUGGEST AND UNDERLYING SUBSTANTIAL SIZED OCCULT UROTHELIAL LESION      NO VARIANT ANATOMY SUCH AS URACHAL REMNANT OR  DUPLICATED/ECTOPIC  URETERS      NO ACUTE UNANTICIPATED PROCESS IN THE ABDOMEN OR PELVIS OUTSIDE THE  URINARY TRACT      MACRO:  None      Patient ID: Shilpi Barraza is a 64 y.o. female.    Cystoscopy    Date/Time: 3/13/2025 8:45 AM    Performed by: Jacky Hunt MD  Authorized by: Jacky Hunt MD          PROCEDURE NOTE:     OPERATION:  Flexible Cystourethroscopy     SURGEON: Jacky Hunt MD     ANESTHESIA:  2% lidocaine jelly     COMPLICATIONS:  None     SPECIMEN:  Voided urine was not collected and submitted for cytology.     Estimated Blood Loss: Minimal      Complications: None      Patient presented for cystoscopy. They were brought to the procedure room, they were consented, the patient was prepped in normal fashion and placed on the cystobed.     A flexible scope was inserted and the entire urethra and bladder were examined.? A complete cystoscopy was performed. 2 erythematous area areas along the Lateral wall of the bladder towards the dome, , right looks more ulcerative, left looks more hyperemic mucosa noted. The ureteral orifices were in normal location.  Mild trabeculation noted throughout. No tumors or stones noted.      The patient tolerated the procedure well. There were no complications. Routine post-cystoscopy instructions were given.      Assessment/Plan   Shilpi Barraza is a 64 y.o. female presenting with recurrent UTIs, microscopic hematuria, pelvic pain and a known 7 mm Left lower pole kidney stone.     Patient underwent cystoscopic evaluation today that showed 2 erythematous area areas along the Lateral wall of the bladder towards the dome, , right looks more ulcerative, left looks more hyperemic mucosa concerning for pathology needing biopsies.We discussed the necessity to proceed with cystoscopy with bladder biopsy and fulguration under anesthesia. Risks, benefits and alterative discussed. Patient is in agreement and will be scheduled appropriately.    Patient has a  known kidney  stone in the left  lower pole, we discussed that even though she is asymptomatic from this but kidney stone may be a nidus for infection hence we will possible treat this electively.     We also recommend the patient continue using her vaginal estrogen cream 2-3 times a week.    Patient understands and elects to proceed.       Scribe Attestation  By signing my name below, INely Scribe attest that this documentation has been prepared under the direction and in the presence of Jacky Hunt MD. All medical record entries made by the Scribe were at my direction or personally dictated by me. I have reviewed the chart and agree that the record accurately reflects my personal performance of the history, physical exam, discussion and plan.

## 2025-03-17 ASSESSMENT — DUKE ACTIVITY SCORE INDEX (DASI)
DASI METS SCORE: 8
CAN YOU HAVE SEXUAL RELATIONS: YES
CAN YOU DO LIGHT WORK AROUND THE HOUSE LIKE DUSTING OR WASHING DISHES: YES
CAN YOU RUN A SHORT DISTANCE: NO
CAN YOU DO YARD WORK LIKE RAKING LEAVES, WEEDING OR PUSHING A MOWER: YES
CAN YOU WALK INDOORS, SUCH AS AROUND YOUR HOUSE: YES
CAN YOU PARTICIPATE IN MODERATE RECREATIONAL ACTIVITIES LIKE GOLF, BOWLING, DANCING, DOUBLES TENNIS OR THROWING A BASEBALL OR FOOTBALL: YES
CAN YOU TAKE CARE OF YOURSELF (EAT, DRESS, BATHE, OR USE TOILET): YES
CAN YOU WALK A BLOCK OR TWO ON LEVEL GROUND: YES
CAN YOU DO MODERATE WORK AROUND THE HOUSE LIKE VACUUMING, SWEEPING FLOORS OR CARRYING GROCERIES: YES
CAN YOU DO HEAVY WORK AROUND THE HOUSE LIKE SCRUBBING FLOORS OR LIFTING AND MOVING HEAVY FURNITURE: YES
CAN YOU PARTICIPATE IN STRENOUS SPORTS LIKE SWIMMING, SINGLES TENNIS, FOOTBALL, BASKETBALL, OR SKIING: NO
CAN YOU CLIMB A FLIGHT OF STAIRS OR WALK UP A HILL: YES
TOTAL_SCORE: 42.7

## 2025-03-17 ASSESSMENT — LIFESTYLE VARIABLES: SMOKING_STATUS: NONSMOKER

## 2025-03-17 ASSESSMENT — ACTIVITIES OF DAILY LIVING (ADL): ADL_SCORE: 0

## 2025-03-18 ENCOUNTER — LAB (OUTPATIENT)
Dept: LAB | Facility: HOSPITAL | Age: 65
End: 2025-03-18
Payer: COMMERCIAL

## 2025-03-18 ENCOUNTER — PRE-ADMISSION TESTING (OUTPATIENT)
Dept: PREADMISSION TESTING | Facility: HOSPITAL | Age: 65
End: 2025-03-18
Payer: COMMERCIAL

## 2025-03-18 VITALS
TEMPERATURE: 97.5 F | BODY MASS INDEX: 28.85 KG/M2 | RESPIRATION RATE: 16 BRPM | OXYGEN SATURATION: 98 % | HEIGHT: 64 IN | WEIGHT: 169 LBS | SYSTOLIC BLOOD PRESSURE: 150 MMHG | HEART RATE: 77 BPM | DIASTOLIC BLOOD PRESSURE: 68 MMHG

## 2025-03-18 DIAGNOSIS — Z01.818 PREOP TESTING: Primary | ICD-10-CM

## 2025-03-18 DIAGNOSIS — N32.9 LESION OF BLADDER: ICD-10-CM

## 2025-03-18 LAB
ANION GAP SERPL CALC-SCNC: 10 MMOL/L (ref 10–20)
APPEARANCE UR: CLEAR
ATRIAL RATE: 79 BPM
BILIRUB UR STRIP.AUTO-MCNC: NEGATIVE MG/DL
BUN SERPL-MCNC: 21 MG/DL (ref 6–23)
CALCIUM SERPL-MCNC: 9.5 MG/DL (ref 8.6–10.3)
CHLORIDE SERPL-SCNC: 101 MMOL/L (ref 98–107)
CO2 SERPL-SCNC: 31 MMOL/L (ref 21–32)
COLOR UR: ABNORMAL
CREAT SERPL-MCNC: 0.92 MG/DL (ref 0.5–1.05)
EGFRCR SERPLBLD CKD-EPI 2021: 70 ML/MIN/1.73M*2
ERYTHROCYTE [DISTWIDTH] IN BLOOD BY AUTOMATED COUNT: 12.6 % (ref 11.5–14.5)
GLUCOSE SERPL-MCNC: 105 MG/DL (ref 74–99)
GLUCOSE UR STRIP.AUTO-MCNC: NORMAL MG/DL
HCT VFR BLD AUTO: 39.1 % (ref 36–46)
HGB BLD-MCNC: 13.2 G/DL (ref 12–16)
HOLD SPECIMEN: NORMAL
HYALINE CASTS #/AREA URNS AUTO: ABNORMAL /LPF
KETONES UR STRIP.AUTO-MCNC: NEGATIVE MG/DL
LEUKOCYTE ESTERASE UR QL STRIP.AUTO: ABNORMAL
MCH RBC QN AUTO: 30 PG (ref 26–34)
MCHC RBC AUTO-ENTMCNC: 33.8 G/DL (ref 32–36)
MCV RBC AUTO: 89 FL (ref 80–100)
MUCOUS THREADS #/AREA URNS AUTO: ABNORMAL /LPF
NITRITE UR QL STRIP.AUTO: NEGATIVE
NRBC BLD-RTO: 0 /100 WBCS (ref 0–0)
P AXIS: 29 DEGREES
P OFFSET: 185 MS
P ONSET: 143 MS
PH UR STRIP.AUTO: 5 [PH]
PLATELET # BLD AUTO: 243 X10*3/UL (ref 150–450)
POTASSIUM SERPL-SCNC: 4.1 MMOL/L (ref 3.5–5.3)
PR INTERVAL: 140 MS
PROT UR STRIP.AUTO-MCNC: NEGATIVE MG/DL
Q ONSET: 213 MS
QRS COUNT: 12 BEATS
QRS DURATION: 78 MS
QT INTERVAL: 384 MS
QTC CALCULATION(BAZETT): 440 MS
QTC FREDERICIA: 421 MS
R AXIS: -17 DEGREES
RBC # BLD AUTO: 4.4 X10*6/UL (ref 4–5.2)
RBC # UR STRIP.AUTO: ABNORMAL MG/DL
RBC #/AREA URNS AUTO: ABNORMAL /HPF
SODIUM SERPL-SCNC: 138 MMOL/L (ref 136–145)
SP GR UR STRIP.AUTO: 1.02
T AXIS: 47 DEGREES
T OFFSET: 405 MS
UROBILINOGEN UR STRIP.AUTO-MCNC: NORMAL MG/DL
VENTRICULAR RATE: 79 BPM
WBC # BLD AUTO: 8.8 X10*3/UL (ref 4.4–11.3)
WBC #/AREA URNS AUTO: ABNORMAL /HPF

## 2025-03-18 PROCEDURE — 93005 ELECTROCARDIOGRAM TRACING: CPT

## 2025-03-18 PROCEDURE — 87086 URINE CULTURE/COLONY COUNT: CPT | Mod: STJLAB

## 2025-03-18 PROCEDURE — 81001 URINALYSIS AUTO W/SCOPE: CPT

## 2025-03-18 PROCEDURE — 99202 OFFICE O/P NEW SF 15 MIN: CPT | Performed by: NURSE PRACTITIONER

## 2025-03-18 NOTE — PREPROCEDURE INSTRUCTIONS
Thank you for visiting [facility]read[facility] at [facility]. If you have any changes to your health condition, please call the SURGEON's office to alert them and give them details of your symptoms.        Preoperative Brain Exercises    What are brain exercises?  A brain exercise is any activity that engages your thinking (cognitive) skills.    What types of activities are considered brain exercises?  Jigsaw puzzles, crossword puzzles, word jumble, memory games, word search, and many more.  Many can be found free online or on your phone via a mobile emerson.    Why should I do brain exercises before my surgery?  More recent research has shown brain exercise before surgery can lower the risk of postoperative delirium (confusion) which can be especially important for older adults.  Patients who did brain exercises for 5 to 10 hours the days before surgery, cut their risk of postoperative delirium in half up to 1 week after surgery.      Preoperative Deep Breathing Exercises    Why it is important to do deep breathing exercises before my surgery?  Deep breathing exercises strengthen your breathing muscles.  This helps you to recover after your surgery and decreases the chance of breathing complications.    How are the deep breathing exercises done?  Sit straight with your back supported.  Breathe in deeply and slowly through your nose. Your lower rib cage should expand and your abdomen may move forward.  Hold that breath for 3 to 5 seconds.  Breathe out through pursed lips, slowly and completely.  Rest and repeat 10 times every hour while awake.  Rest longer if you become dizzy or lightheaded.      Patient and Family Education   Ways You Can Help Prevent Blood Clots     This handout explains some simple things you can do to help prevent blood clots.      Blood clots are blockages that can form in the body's veins. When a blood clot forms in your deep veins, it may be called a deep vein thrombosis, or DVT for short. Blood clots can  happen in any part of the body where blood flows, but they are most common in the arms and legs. If a piece of a blood clot breaks free and travels to the lungs, it is called a pulmonary embolus (PE). A PE can be a very serious problem.      Being in the hospital or having surgery can raise your chances of getting a blood clot because you may not be well enough to move around as much as you normally do.      Ways you can help prevent blood clots in the hospital         Wearing SCDs. SCDs stands for Sequential Compression Devices.   SCDs are special sleeves that wrap around your legs  They attach to a pump that fills them with air to gently squeeze your legs every few minutes.   This helps return the blood in your legs to your heart.   SCDs should only be taken off when walking or bathing.   SCDs may not be comfortable, but they can help save your life.               Wearing compression stockings - if your doctor orders them. These special snug fitting stockings gently squeeze your legs to help blood flow.       Walking. Walking helps move the blood in your legs.   If your doctor says it is ok, try walking the halls at least   5 times a day. Ask us to help you get up, so you don't fall.      Taking any blood thinning medicines your doctor orders.          ©[employer]; 3/23        Ways you can help prevent blood clots at home       Wearing compression stockings - if your doctor orders them. ? Walking - to help move the blood in your legs.       Taking any blood thinning medicines your doctor orders.      Signs of a blood clot or PE      Tell your doctor or nurse know right away if you have of the problems listed below.    If you are at home, seek medical care right away. Call 911 for chest pain or problems breathing.          Signs of a blood clot (DVT) - such as pain,  swelling, redness or warmth in your arm or leg      Signs of a pulmonary embolism (PE) - such as chest     pain or feeling short of breath

## 2025-03-18 NOTE — PREPROCEDURE INSTRUCTIONS
Thank you for visiting Preadmission Testing at [facility]. If you have any changes to your health condition, please call the SURGEON's office to alert them and give them details of your symptoms.        Preoperative Brain Exercises    What are brain exercises?  A brain exercise is any activity that engages your thinking (cognitive) skills.    What types of activities are considered brain exercises?  Jigsaw puzzles, crossword puzzles, word jumble, memory games, word search, and many more.  Many can be found free online or on your phone via a mobile emerson.    Why should I do brain exercises before my surgery?  More recent research has shown brain exercise before surgery can lower the risk of postoperative delirium (confusion) which can be especially important for older adults.  Patients who did brain exercises for 5 to 10 hours the days before surgery, cut their risk of postoperative delirium in half up to 1 week after surgery.      Preoperative Deep Breathing Exercises    Why it is important to do deep breathing exercises before my surgery?  Deep breathing exercises strengthen your breathing muscles.  This helps you to recover after your surgery and decreases the chance of breathing complications.    How are the deep breathing exercises done?  Sit straight with your back supported.  Breathe in deeply and slowly through your nose. Your lower rib cage should expand and your abdomen may move forward.  Hold that breath for 3 to 5 seconds.  Breathe out through pursed lips, slowly and completely.  Rest and repeat 10 times every hour while awake.  Rest longer if you become dizzy or lightheaded.      Patient and Family Education   Ways You Can Help Prevent Blood Clots     This handout explains some simple things you can do to help prevent blood clots.      Blood clots are blockages that can form in the body's veins. When a blood clot forms in your deep veins, it may be called a deep vein thrombosis, or DVT for short. Blood clots can  happen in any part of the body where blood flows, but they are most common in the arms and legs. If a piece of a blood clot breaks free and travels to the lungs, it is called a pulmonary embolus (PE). A PE can be a very serious problem.      Being in the hospital or having surgery can raise your chances of getting a blood clot because you may not be well enough to move around as much as you normally do.      Ways you can help prevent blood clots in the hospital         Wearing SCDs. SCDs stands for Sequential Compression Devices.   SCDs are special sleeves that wrap around your legs  They attach to a pump that fills them with air to gently squeeze your legs every few minutes.   This helps return the blood in your legs to your heart.   SCDs should only be taken off when walking or bathing.   SCDs may not be comfortable, but they can help save your life.               Wearing compression stockings - if your doctor orders them. These special snug fitting stockings gently squeeze your legs to help blood flow.       Walking. Walking helps move the blood in your legs.   If your doctor says it is ok, try walking the halls at least   5 times a day. Ask us to help you get up, so you don't fall.      Taking any blood thinning medicines your doctor orders.          ©[facility]; 3/23        Ways you can help prevent blood clots at home       Wearing compression stockings - if your doctor orders them. ? Walking - to help move the blood in your legs.       Taking any blood thinning medicines your doctor orders.      Signs of a blood clot or PE      Tell your doctor or nurse know right away if you have of the problems listed below.    If you are at home, seek medical care right away. Call 911 for chest pain or problems breathing.          Signs of a blood clot (DVT) - such as pain,  swelling, redness or warmth in your arm or leg      Signs of a pulmonary embolism (PE) - such as chest     pain or feeling short of breath

## 2025-03-18 NOTE — CPM/PAT H&P
"CPM/PAT Evaluation       Name: Shilpi Barraza (Shilpi Barraza)  /Age: 1960/64 y.o.     In-Person       Chief Complaint: Cystoscopy    HPI64 year old female for cystoscopy, with Lesion Excision 3/25/25. Patient has been having lower mid abdominal pain that she describes as \"pretty constant\". Occasional dysuria. Denies hematuria. Has had frequent UTIs since October and states she also has a kidney stone.     Past Medical History:   Diagnosis Date    Encounter for gynecological examination (general) (routine) without abnormal findings     Pap test, as part of routine gynecological examination    GERD (gastroesophageal reflux disease)     Hyperlipidemia     Hypertension     Nephrolithiasis     Other conditions influencing health status     Mammogram normal       Past Surgical History:   Procedure Laterality Date    CHOLECYSTECTOMY      COLONOSCOPY      Colonoscopy    DILATION AND CURETTAGE OF UTERUS  2015    Dilation And Curettage    GALLBLADDER SURGERY  2015    Gallbladder Surgery    HYSTEROSCOPY  2015    Hysteroscopy With Endometrial Ablation    MOUTH SURGERY      Oral Surgery       Patient  has no history on file for sexual activity.    Family History   Problem Relation Name Age of Onset    Breast cancer Mother Angeles Wei     Hypertension Father Ez Wei        Allergies   Allergen Reactions    Crestor [Rosuvastatin] Myalgia       Prior to Admission medications    Medication Sig Start Date End Date Taking? Authorizing Provider   estradiol (Estrace) 0.01 % (0.1 mg/gram) vaginal cream Apply pea size amount 0.5 gram to vaginal opening with finger daily for 2 weeks, then 2-3 times per week. 3/5/25 3/5/26  DONNA Zhong-CNP   losartan-hydrochlorothiazide (Hyzaar) 100-12.5 mg tablet Take 1 tablet by mouth once daily. 25  Toña Mariano PA-C   niacin 500 mg ER tablet Take 1 tablet (500 mg) by mouth once daily at bedtime. Do not crush, chew, or split.    Historical " Provider, MD   omega 3-dha-epa-fish oil (Fish OiL) 1,000 (120-180) mg capsule Take by mouth.    Historical Provider, MD   omeprazole (PriLOSEC) 40 mg DR capsule Take 1 capsule (40 mg) by mouth once daily. 3/5/25 3/5/26  Toña Mariano PA-C   trospium (Sanctura) 20 mg tablet Take 1 tablet (20 mg) by mouth 2 times a day. 3/5/25 3/5/26  Rosaline Cheema APRN-CNP   valACYclovir (Valtrex) 1 gram tablet Take 1 tablet (1,000 mg) by mouth once daily. 3/5/25 3/5/26  Toña Mariano PA-C      Refer to updated medication list on file      Constitutional: Negative for fever, chills, or sweats   ENMT: Negative for nasal discharge, congestion, ear pain, mouth pain, throat pain   Respiratory: Negative for cough, wheezing, shortness of breath   Cardiac: Negative for chest pain, dyspnea on exertion, palpitations   Gastrointestinal: Negative for vomiting, diarrhea, constipation, Positive for abdominal pain and occasional nausea  Genitourinary: Negative for flank pain, frequency, hematuria Positive for occasional dysuria   Musculoskeletal: Negative for decreased ROM, pain, swelling, weakness   Neurological: Negative for dizziness, confusion, headache  Psychiatric: Negative for mood changes   Skin: Negative for itching, rash, ulcer    Hematologic/Lymph: Negative for bruising, easy bleeding  Allergic/Immunologic: Negative itching, sneezing, swelling     Physical Exam  Constitutional:       Appearance: Normal appearance.   HENT:      Head: Normocephalic.   Eyes:      Extraocular Movements: Extraocular movements intact.   Cardiovascular:      Rate and Rhythm: Normal rate and regular rhythm.      Heart sounds: Normal heart sounds.   Pulmonary:      Effort: Pulmonary effort is normal.      Breath sounds: Normal breath sounds.   Abdominal:      General: Bowel sounds are normal.      Palpations: Abdomen is soft.   Musculoskeletal:         General: Normal range of motion.      Cervical back: Normal range of motion.   Skin:     General: Skin is  "warm and dry.   Neurological:      Mental Status: She is alert and oriented to person, place, and time.   Psychiatric:         Mood and Affect: Mood normal.      PAT AIRWAY:   Airway:     Neck ROM::  Full   Denies missing or loose teeth     Patient Specialist/PCP: Toña Mariano    Visit Vitals  /68   Pulse 77   Temp 36.4 °C (97.5 °F) (Temporal)   Resp 16   Ht 1.626 m (5' 4\")   Wt 76.7 kg (169 lb)   SpO2 98%   BMI 29.01 kg/m²   OB Status Postmenopausal   Smoking Status Never   BSA 1.86 m²       DASI Risk Score      Flowsheet Row Pre-Admission Testing from 3/18/2025 in [facility]   Can you take care of yourself (eat, dress, bathe, or use toilet)?  2.75 filed at 03/17/2025 0940   Can you walk indoors, such as around your house? 1.75 filed at 03/17/2025 0940   Can you walk a block or two on level ground?  2.75 filed at 03/17/2025 0940   Can you climb a flight of stairs or walk up a hill? 5.5 filed at 03/17/2025 0940   Can you run a short distance? 0 filed at 03/17/2025 0940   Can you do light work around the house like dusting or washing dishes? 2.7 filed at 03/17/2025 0940   Can you do moderate work around the house like vacuuming, sweeping floors or carrying groceries? 3.5 filed at 03/17/2025 0940   Can you do heavy work around the house like scrubbing floors or lifting and moving heavy furniture?  8 filed at 03/17/2025 0940   Can you do yard work like raking leaves, weeding or pushing a mower? 4.5 filed at 03/17/2025 0940   Can you have sexual relations? 5.25 filed at 03/17/2025 0940   Can you participate in moderate recreational activities like golf, bowling, dancing, doubles tennis or throwing a baseball or football? 6 filed at 03/17/2025 0940   Can you participate in strenous sports like swimming, singles tennis, football, basketball, or skiing? 0 filed at 03/17/2025 0940   DASI SCORE 42.7 filed at 03/17/2025 0940   METS Score (Will be calculated only when all the questions are answered) 8 " filed at 03/17/2025 0940          Caprini DVT Assessment      Flowsheet Row Pre-Admission Testing from 3/18/2025 in [facility]   DVT Score (IF A SCORE IS NOT CALCULATING, MUST SELECT A BMI TO COMPLETE) 4 filed at 03/17/2025 0940   Surgical Factors Minor surgery planned filed at 03/17/2025 0940   BMI (BMI MUST BE CHOSEN) 30 or less filed at 03/17/2025 0940          Modified Frailty Index      Flowsheet Row Pre-Admission Testing from 3/18/2025 in [facility]   Non-independent functional status (problems with dressing, bathing, personal grooming, or cooking) 0 filed at 03/17/2025 0941   History of diabetes mellitus  0 filed at 03/17/2025 0941   History of COPD 0 filed at 03/17/2025 0941   History of CHF No filed at 03/17/2025 0941   History of MI 0 filed at 03/17/2025 0941   History of Percutaneous Coronary Intervention, Cardiac Surgery, or Angina No filed at 03/17/2025 0941   Hypertension requiring the use of medication  0.0909 filed at 03/17/2025 0941   Peripheral vascular disease 0 filed at 03/17/2025 0941   Impaired sensorium (cognitive impairement or loss, clouding, or delirium) 0 filed at 03/17/2025 0941   TIA or CVA withouy residual deficit 0 filed at 03/17/2025 0941   Cerebrovascular accident with deficit 0 filed at 03/17/2025 0941   Modified Frailty Index Calculator .0909 filed at 03/17/2025 0941          [id number] Stroke Risk Points  Current as of 7 minutes ago        N/A 0 to 9 Points:      Last Change: N/A          The JRU6HI7-YJOb risk score (Lip GH, et al. 2009. © 2010 [employer]) quantifies the risk of stroke for a patient with atrial fibrillation. For patients without atrial fibrillation or under the age of 18 this score appears as N/A. Higher score values generally indicate higher risk of stroke.        This score is not applicable to this patient. Components are not calculated.          Revised Cardiac Risk Index      Flowsheet Row  Pre-Admission Testing from 3/18/2025 in [facility]   High-Risk Surgery (Intraperitoneal, Intrathoracic,Suprainguinal vascular) 0 filed at 03/17/2025 0940   History of ischemic heart disease (History of MI, History of positive exercuse test, Current chest paint considered due to myocardial ischemia, Use of nitrate therapy, ECG with pathological Q Waves) 0 filed at 03/17/2025 0940   History of congestive heart failure (pulmonary edemia, bilateral rales or S3 gallop, Paroxysmal nocturnal dyspnea, CXR showing pulmonary vascular redistribution) 0 filed at 03/17/2025 0940   History of cerebrovascular disease (Prior TIA or stroke) 0 filed at 03/17/2025 0940   Pre-operative insulin treatment 0 filed at 03/17/2025 0940   Pre-operative creatinine>2 mg/dl 0 filed at 03/17/2025 0940   Revised Cardiac Risk Calculator 0 filed at 03/17/2025 0940          Apfel Simplified Score      Flowsheet Row Pre-Admission Testing from 3/18/2025 in [facility]   Smoking status 1 filed at 03/17/2025 0941   History of motion sickness or PONV  0 filed at 03/17/2025 0941   Use of postoperative opioids 1 filed at 03/17/2025 0941   Gender - Female 1=Yes filed at 03/17/2025 0941   Apfel Simplified Score Calculator 3 filed at 03/17/2025 0941          Risk Analysis Index Results This Encounter         3/17/2025  0941             Do you live in a place other than your own home?: 0    When did you begin living in the place you are currently residing?: Greater than one year ago    Any kidney failure, kidney not working well, or seeing a kidney doctor (nephrologist)? If yes, was this for kidney stones or another problem?: 1 Kidney stones    Any history of chronic (long-term) congestive heart failure (CHF)?: 0 No    Any shortness of breath when resting?: 0 No    In the past five years, have you been diagnosed with or treated for cancer?: No    During the last 3 months has it become difficult for you to remember things or  organize your thoughts?: 0 No    Have you lost weight of 10 pounds or more in the past 3 months without trying?: 0 No    Do you have any loss of appetitie?: 0 No    Getting Around (Mobility): 0 Can get around without help    Eatin Can plan and prepare own meals    Toiletin Can use toilet without any help    Personal Hygiene (Bathing, Hand Washing, Changing Clothes): 0 Can shower or bathe without any help    RICH Cancer History: Patient does not indicate history of cancer    Total Risk Analysis Index Score Without Cancer: 19    Total Risk Analysis Index Score: 19          Stop Bang Score      Flowsheet Row Pre-Admission Testing from 3/18/2025 in [facility]   Do you snore loudly? 1 filed at 2025 09   Do you often feel tired or fatigued after your sleep? 0 filed at 2025 0940   Has anyone ever observed you stop breathing in your sleep? 0 filed at 2025 0940   Do you have or are you being treated for high blood pressure? 1 filed at 2025 0940   Recent BMI (Calculated) 29.2 filed at 2025 0940   Is BMI greater than 35 kg/m2? 0=No filed at 2025   Age older than 50 years old? 1=Yes filed at 202540   Is your neck circumference greater than 17 inches (Male) or 16 inches (Female)? 0 filed at 2025 0940   Gender - Male 0=No filed at 2025 0940   STOP-BANG Total Score 3 filed at 2025 0940          Prodigy: High Risk  Total Score: 8              Prodigy Age Score           ARISCAT Score for Postoperative Pulmonary Complications      Flowsheet Row Pre-Admission Testing from 3/18/2025 in [facility]   Age Calculated Score 3 filed at 2025 0941   Preoperative SpO2 0 filed at 2025 0941   Respiratory infection in the last month Either upper or lower (i.e., URI, bronchitis, pneumonia), with fever and antibiotic treatment 0 filed at 2025 0941   Preoperative anemia (Hgb less than 10 g/dl) 0 filed at 2025 0941    Surgical incision  0 filed at 03/17/2025 0941   Duration of surgery  0 filed at 03/17/2025 0941   Emergency Procedure  0 filed at 03/17/2025 0941   ARISCAT Total Score  3 filed at 03/17/2025 0941          Natalio Perioperative Risk for Myocardial Infarction or Cardiac Arrest (LUCAS)      Flowsheet Row Pre-Admission Testing from 3/18/2025 in [facility]   Calculated Age Score 1.28 filed at 03/17/2025 0941   Functional Status  0 filed at 03/17/2025 0941   ASA Class  -3.29 filed at 03/17/2025 0941   Creatinine 0 filed at 03/17/2025 0941   Type of Procedure  -0.26 filed at 03/17/2025 0941   LUCAS Total Score  -7.52 filed at 03/17/2025 0941   LUCAS % 0.05 filed at 03/17/2025 0941          Assessment and Plan:     Preop:   OR with Dr Hunt.   Labs ordered per Dr. Hunt.   EKG obtained and enclosed, last EKG on file 2015. Patient advised to discuss EKG from today with PCP at her next appointment for continued monitoring. She verbalized understanding.     Neurologic:   Preoperative brain exercise educational handout provided to patient.  The patient is at an increased risk for perioperative stroke secondary to HTN, Hyperlipidemia    Cardiac:  DASI Score: 42.7   MET Score: 8  RCRI  0 which is 3.9% 30 day risk of MACE (risk for cardiac death, nonfatal myocardial infarction, and nonfactal cardiac arrest)  LUCAS score which indicates a 0.05% risk of intraoperative or 30-day postoperative MACE  Hypertension: compliant with Hyzaar  Hyperlipidemia: compliant with Niacin    Pulmonary:   STOP-BANG score of  3. Intermediate risk of obstructive sleep apnea.   ARISCAT: 3 points which is a low (1.6%) risk of in-hospital post-op pulmonary complications     GI:  GERD: compliant with Prilosec  Apfel: 3 points 61% risk for post operative N/V    /Renal:   Above    Hematologic:   Caprini score 4, patient at high risk for perioperative DVT. Patient provided with VTE education/handout.     Anesthesia: No history of anesthesia  complications. No anesthesia concerns.      *See risk scores as previously documented

## 2025-03-18 NOTE — PREPROCEDURE INSTRUCTIONS
Medication List            Accurate as of March 18, 2025  9:29 AM. Always use your most recent med list.                estradiol 0.01 % (0.1 mg/gram) vaginal cream  Commonly known as: Estrace  Apply pea size amount 0.5 gram to vaginal opening with finger daily for 2 weeks, then 2-3 times per week.  Medication Adjustments for Surgery: Do Not take on the morning of surgery     Fish OiL 1,000 (120-180) mg capsule  Generic drug: omega 3-dha-epa-fish oil  Additional Medication Adjustments for Surgery: Take last dose 7 days before surgery     losartan-hydrochlorothiazide 100-12.5 mg tablet  Commonly known as: Hyzaar  Take 1 tablet by mouth once daily.  Medication Adjustments for Surgery: Take last dose 1 day (24 hours) before surgery     niacin 500 mg ER tablet  Commonly known as: Niaspan  Medication Adjustments for Surgery: Take last dose 1 day (24 hours) before surgery     omeprazole 40 mg DR capsule  Commonly known as: PriLOSEC  Take 1 capsule (40 mg) by mouth once daily.  Medication Adjustments for Surgery: Take/Use as prescribed     trospium 20 mg tablet  Commonly known as: Sanctura  Take 1 tablet (20 mg) by mouth 2 times a day.  Medication Adjustments for Surgery: Do Not take on the morning of surgery     valACYclovir 1 gram tablet  Commonly known as: Valtrex  Take 1 tablet (1,000 mg) by mouth once daily.  Medication Adjustments for Surgery: Take/Use as prescribed                     PRE-OPERATIVE INSTRUCTIONS    You will receive notification one business day prior to your procedure to confirm your arrival time. It is important that you answer your phone and/or check your messages during this time. If you do not hear from the surgery center by 5 pm. the day before your procedure, please call [phone].     Please enter the building through the Outpatient entrance and take the elevator off the lobby to the 2nd floor then check in at the Outpatient Surgery desk on the 2nd floor.    INSTRUCTIONS:  Talk to your  surgeon for instructions if you should stop your aspirin, blood thinner, or diabetes medicines.  DO NOT take any multivitamins or over the counter supplements for 7-10 days before surgery.  If not being admitted, you must have an adult immediately available to drive you home after surgery. We also highly recommend you have someone stay with you for the entire day and night of your surgery.  For children having surgery, a parent or legal guardian must accompany them to the surgery center. If this is not possible, please call [phone] to make additional arrangements.  For adults who are unable to consent or make medical decisions for themselves, a legal guardian or Power of  must accompany them to the surgery center. If this is not possible, please call [phone] to make additional arrangements.  Wear comfortable, loose fitting clothing.  All jewelry and piercings must be removed. If you are unable to remove an item or have a dermal piercing, please be sure to tell the nurse when you arrive for surgery.  Nail polish and make-up must be removed.  Avoid smoking or consuming alcohol for 24 hours before surgery.  To help prevent infection, please take a shower/bath and wash your hair the night before and/or morning of surgery (or follow other specific bathing instructions provided).    Preoperative Fasting Guidelines    Why must I stop eating and drinking near surgery time?  With sedation, food or liquid in your stomach can enter your lungs causing serious complications  Increases nausea and vomiting    When do I need to stop eating and drinking before my surgery?  Do not eat any solid food after midnight the night before your surgery/procedure unless otherwise instructed by your surgeon.   You may have up to 13.5 ounces of clear liquid until TWO hours before your instructed arrival time to the hospital.  This includes water, black tea/coffee, (no milk or cream) apple juice, and electrolyte drinks  (Faisal).   You may chew gum until TWO hours before your surgery/procedure      If applicable, notify your surgeons office immediately of any new skin changes that occur to the surgical limb.      If you have any questions or concerns, please call Pre-Admission Testing at [phone].

## 2025-03-19 LAB — BACTERIA UR CULT: NO GROWTH

## 2025-03-25 ENCOUNTER — ANESTHESIA EVENT (OUTPATIENT)
Dept: OPERATING ROOM | Facility: HOSPITAL | Age: 65
End: 2025-03-25
Payer: COMMERCIAL

## 2025-03-25 ENCOUNTER — PHARMACY VISIT (OUTPATIENT)
Dept: PHARMACY | Facility: CLINIC | Age: 65
End: 2025-03-25
Payer: COMMERCIAL

## 2025-03-25 ENCOUNTER — HOSPITAL ENCOUNTER (OUTPATIENT)
Facility: HOSPITAL | Age: 65
Setting detail: OUTPATIENT SURGERY
Discharge: HOME | End: 2025-03-25
Attending: UROLOGY | Admitting: UROLOGY
Payer: COMMERCIAL

## 2025-03-25 ENCOUNTER — ANESTHESIA (OUTPATIENT)
Dept: OPERATING ROOM | Facility: HOSPITAL | Age: 65
End: 2025-03-25
Payer: COMMERCIAL

## 2025-03-25 VITALS
TEMPERATURE: 96.8 F | SYSTOLIC BLOOD PRESSURE: 138 MMHG | OXYGEN SATURATION: 100 % | RESPIRATION RATE: 15 BRPM | WEIGHT: 170 LBS | HEIGHT: 64 IN | BODY MASS INDEX: 29.02 KG/M2 | HEART RATE: 69 BPM | DIASTOLIC BLOOD PRESSURE: 63 MMHG

## 2025-03-25 DIAGNOSIS — N32.9 LESION OF BLADDER: Primary | ICD-10-CM

## 2025-03-25 LAB
ATRIAL RATE: 79 BPM
P AXIS: 29 DEGREES
P OFFSET: 185 MS
P ONSET: 143 MS
PR INTERVAL: 140 MS
Q ONSET: 213 MS
QRS COUNT: 12 BEATS
QRS DURATION: 78 MS
QT INTERVAL: 384 MS
QTC CALCULATION(BAZETT): 440 MS
QTC FREDERICIA: 421 MS
R AXIS: -17 DEGREES
T AXIS: 47 DEGREES
T OFFSET: 405 MS
VENTRICULAR RATE: 79 BPM

## 2025-03-25 PROCEDURE — RXMED WILLOW AMBULATORY MEDICATION CHARGE

## 2025-03-25 PROCEDURE — 2500000004 HC RX 250 GENERAL PHARMACY W/ HCPCS (ALT 636 FOR OP/ED): Performed by: ANESTHESIOLOGIST ASSISTANT

## 2025-03-25 PROCEDURE — 7100000001 HC RECOVERY ROOM TIME - INITIAL BASE CHARGE: Performed by: UROLOGY

## 2025-03-25 PROCEDURE — 3700000002 HC GENERAL ANESTHESIA TIME - EACH INCREMENTAL 1 MINUTE: Performed by: UROLOGY

## 2025-03-25 PROCEDURE — 3600000003 HC OR TIME - INITIAL BASE CHARGE - PROCEDURE LEVEL THREE: Performed by: UROLOGY

## 2025-03-25 PROCEDURE — 3700000001 HC GENERAL ANESTHESIA TIME - INITIAL BASE CHARGE: Performed by: UROLOGY

## 2025-03-25 PROCEDURE — 7100000009 HC PHASE TWO TIME - INITIAL BASE CHARGE: Performed by: UROLOGY

## 2025-03-25 PROCEDURE — 7100000002 HC RECOVERY ROOM TIME - EACH INCREMENTAL 1 MINUTE: Performed by: UROLOGY

## 2025-03-25 PROCEDURE — 2500000005 HC RX 250 GENERAL PHARMACY W/O HCPCS: Performed by: ANESTHESIOLOGIST ASSISTANT

## 2025-03-25 PROCEDURE — 7100000010 HC PHASE TWO TIME - EACH INCREMENTAL 1 MINUTE: Performed by: UROLOGY

## 2025-03-25 PROCEDURE — 2720000007 HC OR 272 NO HCPCS: Performed by: UROLOGY

## 2025-03-25 PROCEDURE — 2500000004 HC RX 250 GENERAL PHARMACY W/ HCPCS (ALT 636 FOR OP/ED): Performed by: UROLOGY

## 2025-03-25 PROCEDURE — 88305 TISSUE EXAM BY PATHOLOGIST: CPT | Mod: TC,STJLAB | Performed by: UROLOGY

## 2025-03-25 PROCEDURE — 2500000004 HC RX 250 GENERAL PHARMACY W/ HCPCS (ALT 636 FOR OP/ED): Performed by: STUDENT IN AN ORGANIZED HEALTH CARE EDUCATION/TRAINING PROGRAM

## 2025-03-25 PROCEDURE — 3600000008 HC OR TIME - EACH INCREMENTAL 1 MINUTE - PROCEDURE LEVEL THREE: Performed by: UROLOGY

## 2025-03-25 RX ORDER — KETOROLAC TROMETHAMINE 30 MG/ML
INJECTION, SOLUTION INTRAMUSCULAR; INTRAVENOUS AS NEEDED
Status: DISCONTINUED | OUTPATIENT
Start: 2025-03-25 | End: 2025-03-25

## 2025-03-25 RX ORDER — DEXAMETHASONE SODIUM PHOSPHATE 10 MG/ML
INJECTION INTRAMUSCULAR; INTRAVENOUS AS NEEDED
Status: DISCONTINUED | OUTPATIENT
Start: 2025-03-25 | End: 2025-03-25

## 2025-03-25 RX ORDER — OXYCODONE HYDROCHLORIDE 5 MG/1
5 TABLET ORAL EVERY 4 HOURS PRN
Status: DISCONTINUED | OUTPATIENT
Start: 2025-03-25 | End: 2025-03-25 | Stop reason: HOSPADM

## 2025-03-25 RX ORDER — ONDANSETRON HYDROCHLORIDE 2 MG/ML
4 INJECTION, SOLUTION INTRAVENOUS ONCE AS NEEDED
Status: COMPLETED | OUTPATIENT
Start: 2025-03-25 | End: 2025-03-25

## 2025-03-25 RX ORDER — PHENAZOPYRIDINE HYDROCHLORIDE 200 MG/1
200 TABLET, FILM COATED ORAL 3 TIMES DAILY
Qty: 15 TABLET | Refills: 0 | Status: SHIPPED | OUTPATIENT
Start: 2025-03-25

## 2025-03-25 RX ORDER — MIDAZOLAM HYDROCHLORIDE 1 MG/ML
1 INJECTION, SOLUTION INTRAMUSCULAR; INTRAVENOUS ONCE AS NEEDED
Status: DISCONTINUED | OUTPATIENT
Start: 2025-03-25 | End: 2025-03-25 | Stop reason: HOSPADM

## 2025-03-25 RX ORDER — MEPERIDINE HYDROCHLORIDE 50 MG/ML
12.5 INJECTION INTRAMUSCULAR; INTRAVENOUS; SUBCUTANEOUS EVERY 10 MIN PRN
Status: DISCONTINUED | OUTPATIENT
Start: 2025-03-25 | End: 2025-03-25 | Stop reason: HOSPADM

## 2025-03-25 RX ORDER — FENTANYL CITRATE 50 UG/ML
25 INJECTION, SOLUTION INTRAMUSCULAR; INTRAVENOUS EVERY 5 MIN PRN
Status: DISCONTINUED | OUTPATIENT
Start: 2025-03-25 | End: 2025-03-25 | Stop reason: HOSPADM

## 2025-03-25 RX ORDER — AMOXICILLIN AND CLAVULANATE POTASSIUM 875; 125 MG/1; MG/1
1 TABLET, FILM COATED ORAL 2 TIMES DAILY
Qty: 10 TABLET | Refills: 0 | Status: SHIPPED | OUTPATIENT
Start: 2025-03-25 | End: 2025-03-30

## 2025-03-25 RX ORDER — ONDANSETRON HYDROCHLORIDE 2 MG/ML
INJECTION, SOLUTION INTRAVENOUS AS NEEDED
Status: DISCONTINUED | OUTPATIENT
Start: 2025-03-25 | End: 2025-03-25

## 2025-03-25 RX ORDER — SODIUM CHLORIDE, SODIUM LACTATE, POTASSIUM CHLORIDE, CALCIUM CHLORIDE 600; 310; 30; 20 MG/100ML; MG/100ML; MG/100ML; MG/100ML
100 INJECTION, SOLUTION INTRAVENOUS CONTINUOUS
Status: ACTIVE | OUTPATIENT
Start: 2025-03-25 | End: 2025-03-25

## 2025-03-25 RX ORDER — ROCURONIUM BROMIDE 10 MG/ML
INJECTION, SOLUTION INTRAVENOUS AS NEEDED
Status: DISCONTINUED | OUTPATIENT
Start: 2025-03-25 | End: 2025-03-25

## 2025-03-25 RX ORDER — ALBUTEROL SULFATE 0.83 MG/ML
2.5 SOLUTION RESPIRATORY (INHALATION) ONCE AS NEEDED
Status: DISCONTINUED | OUTPATIENT
Start: 2025-03-25 | End: 2025-03-25 | Stop reason: HOSPADM

## 2025-03-25 RX ORDER — FENTANYL CITRATE 50 UG/ML
INJECTION, SOLUTION INTRAMUSCULAR; INTRAVENOUS AS NEEDED
Status: DISCONTINUED | OUTPATIENT
Start: 2025-03-25 | End: 2025-03-25

## 2025-03-25 RX ORDER — CEFAZOLIN SODIUM 2 G/100ML
2 INJECTION, SOLUTION INTRAVENOUS ONCE
Status: COMPLETED | OUTPATIENT
Start: 2025-03-25 | End: 2025-03-25

## 2025-03-25 RX ORDER — LIDOCAINE HYDROCHLORIDE 20 MG/ML
INJECTION, SOLUTION EPIDURAL; INFILTRATION; INTRACAUDAL; PERINEURAL AS NEEDED
Status: DISCONTINUED | OUTPATIENT
Start: 2025-03-25 | End: 2025-03-25

## 2025-03-25 RX ORDER — LABETALOL HYDROCHLORIDE 5 MG/ML
5 INJECTION, SOLUTION INTRAVENOUS ONCE AS NEEDED
Status: DISCONTINUED | OUTPATIENT
Start: 2025-03-25 | End: 2025-03-25 | Stop reason: HOSPADM

## 2025-03-25 RX ORDER — MIDAZOLAM HYDROCHLORIDE 1 MG/ML
INJECTION, SOLUTION INTRAMUSCULAR; INTRAVENOUS AS NEEDED
Status: DISCONTINUED | OUTPATIENT
Start: 2025-03-25 | End: 2025-03-25

## 2025-03-25 RX ORDER — ONDANSETRON HYDROCHLORIDE 2 MG/ML
4 INJECTION, SOLUTION INTRAVENOUS ONCE AS NEEDED
Status: DISCONTINUED | OUTPATIENT
Start: 2025-03-25 | End: 2025-03-25 | Stop reason: HOSPADM

## 2025-03-25 RX ORDER — PROPOFOL 10 MG/ML
INJECTION, EMULSION INTRAVENOUS AS NEEDED
Status: DISCONTINUED | OUTPATIENT
Start: 2025-03-25 | End: 2025-03-25

## 2025-03-25 RX ORDER — LIDOCAINE HYDROCHLORIDE 10 MG/ML
0.1 INJECTION, SOLUTION INFILTRATION; PERINEURAL ONCE
Status: DISCONTINUED | OUTPATIENT
Start: 2025-03-25 | End: 2025-03-25 | Stop reason: HOSPADM

## 2025-03-25 RX ADMIN — Medication 15 MG: at 14:40

## 2025-03-25 RX ADMIN — KETOROLAC TROMETHAMINE 30 MG: 30 INJECTION, SOLUTION INTRAMUSCULAR at 14:47

## 2025-03-25 RX ADMIN — CEFAZOLIN SODIUM 2 G: 2 INJECTION, SOLUTION INTRAVENOUS at 14:19

## 2025-03-25 RX ADMIN — SUGAMMADEX 200 MG: 100 INJECTION, SOLUTION INTRAVENOUS at 14:49

## 2025-03-25 RX ADMIN — DEXAMETHASONE SODIUM PHOSPHATE 10 MG: 10 INJECTION, SOLUTION INTRAMUSCULAR; INTRAVENOUS at 14:22

## 2025-03-25 RX ADMIN — MIDAZOLAM 2 MG: 1 INJECTION INTRAMUSCULAR; INTRAVENOUS at 14:03

## 2025-03-25 RX ADMIN — SODIUM CHLORIDE, POTASSIUM CHLORIDE, SODIUM LACTATE AND CALCIUM CHLORIDE: 600; 310; 30; 20 INJECTION, SOLUTION INTRAVENOUS at 13:48

## 2025-03-25 RX ADMIN — LIDOCAINE HYDROCHLORIDE 80 MG: 20 INJECTION, SOLUTION EPIDURAL; INFILTRATION; INTRACAUDAL; PERINEURAL at 14:12

## 2025-03-25 RX ADMIN — FENTANYL CITRATE 50 MCG: 50 INJECTION, SOLUTION INTRAMUSCULAR; INTRAVENOUS at 14:12

## 2025-03-25 RX ADMIN — ONDANSETRON 4 MG: 2 INJECTION INTRAMUSCULAR; INTRAVENOUS at 16:14

## 2025-03-25 RX ADMIN — PROPOFOL 200 MG: 10 INJECTION, EMULSION INTRAVENOUS at 14:12

## 2025-03-25 RX ADMIN — Medication 15 MG: at 14:13

## 2025-03-25 RX ADMIN — ONDANSETRON 4 MG: 2 INJECTION INTRAMUSCULAR; INTRAVENOUS at 14:47

## 2025-03-25 RX ADMIN — ROCURONIUM BROMIDE 40 MG: 10 INJECTION INTRAVENOUS at 14:13

## 2025-03-25 RX ADMIN — FENTANYL CITRATE 50 MCG: 50 INJECTION, SOLUTION INTRAMUSCULAR; INTRAVENOUS at 14:40

## 2025-03-25 SDOH — HEALTH STABILITY: MENTAL HEALTH: CURRENT SMOKER: 0

## 2025-03-25 ASSESSMENT — PAIN SCALES - GENERAL
PAINLEVEL_OUTOF10: 0 - NO PAIN
PAIN_LEVEL: 0
PAINLEVEL_OUTOF10: 0 - NO PAIN
PAINLEVEL_OUTOF10: 0 - NO PAIN
PAINLEVEL_OUTOF10: 2
PAINLEVEL_OUTOF10: 0 - NO PAIN

## 2025-03-25 ASSESSMENT — PAIN - FUNCTIONAL ASSESSMENT
PAIN_FUNCTIONAL_ASSESSMENT: 0-10

## 2025-03-25 ASSESSMENT — COLUMBIA-SUICIDE SEVERITY RATING SCALE - C-SSRS
2. HAVE YOU ACTUALLY HAD ANY THOUGHTS OF KILLING YOURSELF?: NO
1. IN THE PAST MONTH, HAVE YOU WISHED YOU WERE DEAD OR WISHED YOU COULD GO TO SLEEP AND NOT WAKE UP?: NO
6. HAVE YOU EVER DONE ANYTHING, STARTED TO DO ANYTHING, OR PREPARED TO DO ANYTHING TO END YOUR LIFE?: NO

## 2025-03-25 NOTE — OP NOTE
CYSTOSCOPY, WITH LESION EXCISION, BLADDER BIOPSY AND FULGURATION Operative Note     Date: 3/25/2025  OR Location: [facility] OR    Name: Shilpi Barraza, : 1960, Age: 64 y.o., MRN: [id number], Sex: female    Diagnosis  Pre-op Diagnosis      * Lesion of bladder [N32.9] Post-op Diagnosis     * Lesion of bladder [N32.9]     Procedures  CYSTOSCOPY, WITH LESION EXCISION, BLADDER BIOPSY AND FULGURATION  27356 - SD CYSTO W/REMOVAL OF LESIONS SMALL      Surgeons      * Jacky Hunt - Primary    Resident/Fellow/Other Assistant:  Surgeons and Role:     * Imani Hidalgo MD - Resident - Assisting    Staff:   Scrub Person: Sadia  Circulator: Nini  Circulator: Stacy    Anesthesia Staff: Anesthesiologist: DO LOIDA Brand-AA: DILIP Clemens    Procedure Summary  Anesthesia: General  ASA: ASA status not filed in the log.  Estimated Blood Loss: 5 mL  Intra-op Medications:   Administrations occurring from 1355 to 1510 on 25:   Medication Name Total Dose   ceFAZolin (Ancef) 2 g in dextrose (iso)  mL 2 g   dexAMETHasone (Decadron) PF injection 10 mg/mL 10 mg   dexmedeTOMIDine (Precedex) bolus from bag 12 mcg   fentaNYL (Sublimaze) injection 50 mcg/mL 100 mcg   ketamine injection 50 mg/ 5 mL (10 mg/mL) 30 mg   ketorolac (Toradol) injection 30 mg 30 mg   LR bolus Cannot be calculated   lidocaine PF (Xylocaine-MPF) local injection 2 % 80 mg   midazolam (Versed) injection 1 mg/mL 2 mg   ondansetron (Zofran) 2 mg/mL injection 4 mg   propofol (Diprivan) injection 10 mg/mL 200 mg   rocuronium 10 mg/mL 40 mg   sugammadex (Bridion) 200 mg/2 mL injection 200 mg              Anesthesia Record               Intraprocedure I/O Totals          Intake    Dexmedetomidine 0.00 mL    The total shown is the total volume documented since Anesthesia Start was filed.    LR bolus 400.00 mL    Total Intake 400 mL          Specimen:   ID Type Source Tests Collected by Time   1 : BLADDER LESION Tissue BLADDER BIOPSY  SURGICAL PATHOLOGY EXAM Jacky Hunt MD 3/25/2025 1156                 Drains and/or Catheters: * None in log *    Tourniquet Times: n/a        Implants: n/a    Findings: Two hypervascular areas-- one at the left dome, one on the right lateral wall each 2cm in size, biopsy taken of left dome lesion due to velvety appearance, remainder of lesion fulgurated, right lateral wall lesion with central ulceration fulgurated completely    Indications: Shilpi Barraza is an 64 y.o. female who is having surgery for Lesion of bladder [N32.9].     The patient was seen in the preoperative area. The risks, benefits, complications, treatment options, non-operative alternatives, expected recovery and outcomes were discussed with the patient. The possibilities of reaction to medication, pulmonary aspiration, injury to surrounding structures, bleeding, recurrent infection, the need for additional procedures, failure to diagnose a condition, and creating a complication requiring transfusion or operation were discussed with the patient. The patient concurred with the proposed plan, giving informed consent.  The site of surgery was properly noted/marked if necessary per policy. The patient has been actively warmed in preoperative area. Preoperative antibiotics have been ordered and given within 1 hours of incision. Venous thrombosis prophylaxis have been ordered including bilateral sequential compression devices    Procedure Details:     Patient was brought to the operating room and placed in supine position on the operating room table. A timeout was performed. All were in agreement. Patient underwent general anesthesia without complication. They were repositioned in dorsal lithotomy and prepped and draped in the usual sterile fashion.     A 22 Fr rigid cystoscope was used to perform cystourethroscopy. Urethra was unremarkable with no tumors or lesions. Bilateral ureteral orifices were in normal orthotopic position. No stones were  identified.  Two flat lesions were noted.  There was a 2 cm x 2 cm hypervascular lesion at the left aspect of the bladder dome which appeared velvety in texture.  There was a 2 cm x 2 cm hypervascular lesion on the right lateral wall which was normal in texture.  The central aspect of this lesion did have some mucosal tears versus ulceration.  Both lesions were seen to bleed spontaneously when the bladder was filled.    A rigid biopsy forceps was used to take a deep biopsy specimen of the left dome lesion.  Muscle was visualized in the biopsy bed.    26 Panamanian resectoscope was inserted per urethra.  The thin electrocautery loop was used to cauterize the biopsy bed as well as the entirety of both lesions.  No bleeding was noted after emptying the bladder.    The bladder was drained and instruments were removed. Patient was awoken from anesthesia and transferred to PACU in stable condition.     Complications:  None; patient tolerated the procedure well.    Disposition: PACU - hemodynamically stable.  Condition: stable       Plan:  -Discharge with antibiotics and Pyridium  -Follow-up as scheduled to discuss pathology results      Dr. Hunt was present and scrubbed for the duration of the procedure and performed all critical steps.        Imani Hidalgo MD  PGY-2 Urology [place]  Adult Urology Pager: [phone]  Pediatric Urology Pager: [phone]

## 2025-03-25 NOTE — DISCHARGE INSTRUCTIONS
DEPARTMENT OF UROLOGY  DISCHARGE INSTRUCTIONS -- Transurethral resection of bladder mass  Outpatient Surgery    C O N F I D E N T I A L   I N F O R M A T I O N    Shilpi Barraza      Call [phone] during regular daytime business hours (8:00 am - 5:00 pm) and after 5:00 pm and ask for the Urology resident with any questions or concerns.      If it is a life-threatening situation, proceed to the nearest emergency department.        Follow-up appointment:      4/15/2025 with Rosaline REAGAN     Thank you for the opportunity to care for you today.  Your health and healing are very important to us.  We hope we made you feel as comfortable as possible and are committed to your recovery and continued well-being.      The following is a brief overview of your transurethral bladder tumor resection today. Some of the information contained on this summary may be confidential.  This information should be kept in your records and should be shared with your regular doctor.    Physicians:   Dr. Hunt      Procedure performed: bladder mass resection/biopsy  Pending results:   pathology of tissue taken from your bladder (we will go over these results at your post-operative appointment.)    What to Expect During your Recovery and Home Care  Anesthesia Side Effects   You received anesthesia today.  You may feel sleepy, tired, or have a sore throat.   You may also feel drowsiness, dizziness, or inability to think clearly.  For your safety, do not drive, drink alcoholic beverages, take any unprescribed medication or make any important decisions for 24 hours.  A responsible adult should be with you for 24 hours.        Activity and Recovery    No heavy lifting over ten pounds. Limit activity if you have a urinary catheter in place. Avoid activities that would cause pulling or tugging on your catheter.    Do not drive or operate heavy machinery while taking narcotic pain medications as these medications can alter perception,  impair judgement, and slow reaction times.      Pain Control  Unfortunately, you may experience pain after your procedure.  Adequate management can include alternative measures to help ease your pain and can include over the counter Tylenol. Do not take more than 4,000mg of Tylenol in a 24-hour period.      You may be prescribed Pyridium which can help relieve the feeling of burning with urination. Pyridium will turn your orange yellow and can stain underwear/clothes.    You may also experience bladder spasms due to the catheter. Ditropan may be prescribed to help alleviate this problem.    Nausea/Vomiting   Clear liquids are best tolerated at first. Start slow, advance your diet as tolerated to normal foods. Avoid spicy, greasy, heavy foods at first. Also, you may feel nauseous or like you need to vomit if you take any type of medication on an empty stomach.  Call your physician if you are unable to eat or drink and have persistent vomiting.    Signs of Bleeding   You are going to have some blood in your urine. Your urine will be light pink to yellow. If you have a catheter you always want to look at the urine in the tubing of your catheter and not in the large urine collection bag to check for bleeding. If urine becomes thick dark emma red, has large clots or stops draining, please notify your physician.    Treatment/wound care:   Keep area(s) clean and dry. Clean around insertion site of catheter daily with mild soap and water.  It is okay to shower 24 hours after time of surgery.    Do not submerge your catheter in standing water until seen for follow up appointment (no tub bathing, swimming, or hot tubs).      Signs of Infection  Signs of infection can include fever, drainage(green/yellow), chills, burning sensation with passing of urine, catheter leakage, or severe abdominal pain.  If you see any of these occur, please contact your doctor's office at [phone].  Any fever higher than 100.4, especially if  associated with an ill feeling, abdominal pain, chills, or nausea should be reported to your surgeon.        Assist in bowel movements/urination  Increase fiber in diet  Increase water (6 to 8 glasses)  Increase walking   Urination should occur within 6 hours of anesthesia  If you have tried these methods and your bladder still feels full and you cannot use the bathroom, please go to your nearest Emergency room.    Additional Instructions: CATHETER CARE   Always keep the catheters tubing and drainage bag below the level of your bladder.  Avoid loops and kinks in the catheter tubing.  NOTIFY your physician if catheter falls out or catheter seems clogged and urine is not draining.   Do not wear the small leg bag to bed you should be provided with a larger overnight bag that you should wear to bed and can hang over the side of the bed.  We recommend wearing the large bag in the shower as well as this is easy to dry, and you do not get your leg straps wet from your leg bag.   Your catheter should be secured to your upper thigh, do not allow it to hang or dangle.  Your catheter will be removed at your post-operative appointment.

## 2025-03-25 NOTE — ANESTHESIA POSTPROCEDURE EVALUATION
Patient: Shilpi Barraza    Procedure Summary       Date: 03/25/25 Room / Location: [facility] OR 08 / [facility] OR    Anesthesia Start: 1404 Anesthesia Stop: 1505    Procedure: CYSTOSCOPY, WITH LESION EXCISION, BLADDER BIOPSY AND FULGURATION Diagnosis:       Lesion of bladder      (Lesion of bladder [N32.9])    Surgeons: Jacky Hunt MD Responsible Provider: Edilberto Combs DO    Anesthesia Type: general ASA Status: 2            Anesthesia Type: general    Vitals Value Taken Time   /67 03/25/25 1505   Temp 36 03/25/25 1510   Pulse 86 03/25/25 1509   Resp 0 03/25/25 1509   SpO2 98 % 03/25/25 1509   Vitals shown include unfiled device data.    Anesthesia Post Evaluation    Patient location during evaluation: PACU  Patient participation: complete - patient participated  Level of consciousness: awake  Pain score: 0  Pain management: adequate  Multimodal analgesia pain management approach  Airway patency: patent  Two or more strategies used to mitigate risk of obstructive sleep apnea  Cardiovascular status: acceptable  Respiratory status: acceptable  Hydration status: acceptable  Postoperative Nausea and Vomiting: none        No notable events documented.

## 2025-03-25 NOTE — ANESTHESIA PROCEDURE NOTES
Airway  Date/Time: 3/25/2025 2:17 PM  Urgency: elective    Airway not difficult    Staffing  Performed: DILIP   Authorized by: Edilberto Combs DO    Performed by: DILIP Clemens  Patient location during procedure: OR    Indications and Patient Condition  Indications for airway management: anesthesia  Sedation level: deep  Preoxygenated: yes  Patient position: sniffing  MILS maintained throughout  Mask difficulty assessment: 1 - vent by mask    Final Airway Details  Final airway type: endotracheal airway      Successful airway: ETT  Cuffed: yes   Successful intubation technique: video laryngoscopy  Blade: Leyla  Blade size: #3  ETT size (mm): 7.0  Cormack-Lehane Classification: grade I - full view of glottis  Placement verified by: chest auscultation and capnometry   Measured from: lips  ETT to lips (cm): 21  Number of attempts at approach: 1

## 2025-03-25 NOTE — ANESTHESIA PREPROCEDURE EVALUATION
Patient: Shilpi Barraza    Procedure Information       Anesthesia Start Date/Time: 03/25/25 1404    Procedure: CYSTOSCOPY, WITH LESION EXCISION    Location: [facility] OR 08 / [facility]    Surgeons: Jacky Hunt MD          Vitals:    03/25/25 1226   BP: 150/67   Pulse: 80   Resp: 20   Temp: 36.6 °C (97.9 °F)   SpO2: 100%       Past Surgical History:   Procedure Laterality Date    CHOLECYSTECTOMY      COLONOSCOPY      Colonoscopy    DILATION AND CURETTAGE OF UTERUS  04/13/2015    Dilation And Curettage    GALLBLADDER SURGERY  04/14/2015    Gallbladder Surgery    HYSTEROSCOPY  04/13/2015    Hysteroscopy With Endometrial Ablation    MOUTH SURGERY      Oral Surgery     Past Medical History:   Diagnosis Date    Encounter for gynecological examination (general) (routine) without abnormal findings     Pap test, as part of routine gynecological examination    GERD (gastroesophageal reflux disease)     Hyperlipidemia     Hypertension     Nephrolithiasis     Other conditions influencing health status     Mammogram normal       Current Facility-Administered Medications:     ceFAZolin (Ancef) 2 g in dextrose (iso)  mL, 2 g, intravenous, Once, Jacky Hunt MD, 2 g at 03/25/25 1419    Facility-Administered Medications Ordered in Other Encounters:     fentaNYL PF (Sublimaze) injection, , intravenous, PRN, DILIP Clemens, 50 mcg at 03/25/25 1412    lactated Ringer's bolus, , intravenous, Continuous PRN, DILIP Clemens, New Bag at 03/25/25 1348    lidocaine PF (Xylocaine) 20 mg/mL (2 %) injection, , epidural, PRN, DILIP Clemens, 80 mg at 03/25/25 1412    midazolam (Versed) injection, , intravenous, PRN, DILIP Clemens, 2 mg at 03/25/25 1403    propofol (Diprivan) injection, , intravenous, PRN, DILIP Clemens, 200 mg at 03/25/25 1412    rocuronium (ZeMuron) injection, , intravenous, PRN, DILIP Clemens, 40 mg at 03/25/25 1413  Prior to Admission medications    Medication Sig  Start Date End Date Taking? Authorizing Provider   estradiol (Estrace) 0.01 % (0.1 mg/gram) vaginal cream Apply pea size amount 0.5 gram to vaginal opening with finger daily for 2 weeks, then 2-3 times per week. 3/5/25 3/5/26 Yes MERARY Zhong   losartan-hydrochlorothiazide (Hyzaar) 100-12.5 mg tablet Take 1 tablet by mouth once daily. 2/24/25 8/23/25 Yes Toña Mariano PA-C   niacin 500 mg ER tablet Take 1 tablet (500 mg) by mouth once daily at bedtime. Do not crush, chew, or split.   Yes Historical Provider, MD   omega 3-dha-epa-fish oil (Fish OiL) 1,000 (120-180) mg capsule Take by mouth.   Yes Historical Provider, MD   omeprazole (PriLOSEC) 40 mg DR capsule Take 1 capsule (40 mg) by mouth once daily. 3/5/25 3/5/26 Yes Toña Mariano PA-C   trospium (Sanctura) 20 mg tablet Take 1 tablet (20 mg) by mouth 2 times a day. 3/5/25 3/5/26 Yes MERARY Zhong   valACYclovir (Valtrex) 1 gram tablet Take 1 tablet (1,000 mg) by mouth once daily.  Patient taking differently: Take 1 tablet (1,000 mg) by mouth once daily as needed. Used for cold sores 3/5/25 3/5/26 Yes Toña Mariano PA-C   amoxicillin-pot clavulanate (Augmentin) 875-125 mg tablet Take 1 tablet by mouth 2 times a day for 5 days. 3/25/25 3/30/25  Imani Hidalgo MD   phenazopyridine (Pyridium) 200 mg tablet Take 1 tablet (200 mg) by mouth 3 times a day. 3/25/25   Imani Hidalgo MD     Allergies   Allergen Reactions    Crestor [Rosuvastatin] Myalgia     Social History     Tobacco Use    Smoking status: Never    Smokeless tobacco: Never   Substance Use Topics    Alcohol use: Never         Chemistry    Lab Results   Component Value Date/Time     03/18/2025 0943    K 4.1 03/18/2025 0943     03/18/2025 0943    CO2 31 03/18/2025 0943    BUN 21 03/18/2025 0943    CREATININE 0.92 03/18/2025 0943    CREATININE 0.98 02/11/2025 1253    Lab Results   Component Value Date/Time    CALCIUM 9.5 03/18/2025 0943    ALKPHOS 79 02/16/2023 0910     "AST 15 02/16/2023 0910    ALT 19 02/16/2023 0910    BILITOT 0.6 02/16/2023 0910          Lab Results   Component Value Date/Time    WBC 8.8 03/18/2025 0943    HGB 13.2 03/18/2025 0943    HCT 39.1 03/18/2025 0943     03/18/2025 0943     No results found for: \"PROTIME\", \"PTT\", \"INR\"  Encounter Date: 03/18/25   ECG 12 lead   Result Value    Ventricular Rate 79    Atrial Rate 79    TN Interval 140    QRS Duration 78    QT Interval 384    QTC Calculation(Bazett) 440    P Axis 29    R Axis -17    T Axis 47    QRS Count 12    Q Onset 213    P Onset 143    P Offset 185    T Offset 405    QTC Fredericia 421    Narrative    Normal sinus rhythm  Cannot rule out Anterior infarct , age undetermined  Abnormal ECG  When compared with ECG of 03-JUN-2015 08:15,  Minimal criteria for Anterior infarct are now Present  Confirmed by Andres Naranjo ([id number]) on 3/25/2025 8:06:13 AM        Relevant Problems   Cardiac   (+) Hyperlipidemia   (+) Primary hypertension      Musculoskeletal   (+) Degenerative joint disease (DJD) of hip      ID   (+) Herpes labialis       Clinical information reviewed:   Tobacco  Allergies  Meds   Med Hx  Surg Hx   Fam Hx  Soc Hx        NPO Detail:  NPO/Void Status  Carbohydrate Drink Given Prior to Surgery? : N  Date of Last Liquid: 03/24/25  Time of Last Liquid: 0500  Date of Last Solid: 03/24/25  Time of Last Solid: 2000  Last Intake Type: Clear fluids  Time of Last Void: 1227         Physical Exam    Airway  Mallampati: II  TM distance: >3 FB     Cardiovascular - normal exam  Rhythm: regular  Rate: normal     Dental - normal exam     Pulmonary - normal exam     Abdominal - normal exam  Abdomen: soft             Anesthesia Plan    History of general anesthesia?: yes  History of complications of general anesthesia?: no    ASA 2     general     The patient is not a current smoker.  Patient was previously instructed to abstain from smoking on day of procedure.  Patient did not smoke on day of " procedure.  Education provided regarding risk of obstructive sleep apnea.  intravenous induction   Postoperative administration of opioids is intended.  Anesthetic plan and risks discussed with patient.  Use of blood products discussed with patient who.

## 2025-03-25 NOTE — INTERVAL H&P NOTE
H&P reviewed. The patient was examined and there are no changes to the H&P.        Imani Hidalgo MD  PGY-2 Urology [facility]  Adult Urology Pager: [phone]  Pediatric Urology Pager: [phone]      (2) cough or sneeze

## 2025-04-02 LAB
LABORATORY COMMENT REPORT: NORMAL
PATH REPORT.FINAL DX SPEC: NORMAL
PATH REPORT.GROSS SPEC: NORMAL
PATH REPORT.RELEVANT HX SPEC: NORMAL
PATH REPORT.TOTAL CANCER: NORMAL

## 2025-04-04 DIAGNOSIS — R31.29 MICROSCOPIC HEMATURIA: ICD-10-CM

## 2025-04-04 DIAGNOSIS — R35.0 URINARY FREQUENCY: ICD-10-CM

## 2025-04-15 ENCOUNTER — APPOINTMENT (OUTPATIENT)
Dept: UROLOGY | Facility: CLINIC | Age: 65
End: 2025-04-15
Payer: COMMERCIAL

## 2025-04-15 VITALS — TEMPERATURE: 97.5 F | DIASTOLIC BLOOD PRESSURE: 76 MMHG | SYSTOLIC BLOOD PRESSURE: 138 MMHG | HEART RATE: 76 BPM

## 2025-04-15 DIAGNOSIS — R31.29 MICROSCOPIC HEMATURIA: Primary | ICD-10-CM

## 2025-04-15 DIAGNOSIS — N20.0 NEPHROLITHIASIS: ICD-10-CM

## 2025-04-15 LAB
POC APPEARANCE, URINE: CLEAR
POC BILIRUBIN, URINE: NEGATIVE
POC BLOOD, URINE: ABNORMAL
POC COLOR, URINE: YELLOW
POC GLUCOSE, URINE: NEGATIVE MG/DL
POC KETONES, URINE: NEGATIVE MG/DL
POC LEUKOCYTES, URINE: ABNORMAL
POC NITRITE,URINE: NEGATIVE
POC PH, URINE: 5 PH
POC PROTEIN, URINE: ABNORMAL MG/DL
POC SPECIFIC GRAVITY, URINE: 1.02
POC UROBILINOGEN, URINE: 0.2 EU/DL

## 2025-04-15 PROCEDURE — 3078F DIAST BP <80 MM HG: CPT | Performed by: NURSE PRACTITIONER

## 2025-04-15 PROCEDURE — 99213 OFFICE O/P EST LOW 20 MIN: CPT | Performed by: NURSE PRACTITIONER

## 2025-04-15 PROCEDURE — 3075F SYST BP GE 130 - 139MM HG: CPT | Performed by: NURSE PRACTITIONER

## 2025-04-15 PROCEDURE — 81003 URINALYSIS AUTO W/O SCOPE: CPT | Performed by: NURSE PRACTITIONER

## 2025-04-15 NOTE — PATIENT INSTRUCTIONS
2 of the 24 hr collections two days in a row when you first do it, then 1 24 hr collection when you do f/u  serum blood work includes  calcium-BMP  creatinine-BMP  Chloride BMP  Potassium BMP  Sodium BMP  Uric Acid **  phosphorous **  Magnesium **  Carbon Dioxide **    hold off until 6 weeks after passing a stone or a stone surgery    will send you fedex label, call then number and

## 2025-04-15 NOTE — PROGRESS NOTES
04/15/25   [id number]    Follow up cystoscopy, biopsy bladder, discuss nephrolithiasis, follow up OAB symptoms    Subjective      HPI Shilpi Barraza is a 64 y.o. female who presents for follow up cystoscopy bladder biopsy, fulguration with Dr. Hunt on 3/25/25; Bladder ulcers noted on cystoscopy with Dr. Hunt on 3/13/25 noted with work up for microscopic hematuria; has failed Gemtesa, been on trospium.     No longer on OAB medicine, 100% better with bladder symptoms,no pain, no urgency, frequency or leakage; rare WINIFRED;     Discussed 7-8 mm stone, plans to follow up w Dr. Hunt to have managed. Retiring in May, will plan to do after that, maybe June. 24 hr urine collection discussed; would like to do the litholink evaluation 24 hr urine mid may, plan procedure w Dr. Hunt June/July for kidney stone;     UA large heme, small leuk today, 0 cc    FINAL DIAGNOSIS   A. 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 See note.     Note: Multiple deeper levels were examined. No definitive evidence of malignancy noted.     CT Urogram 2/12/25: IMPRESSION:  NO NEW ACUTE FINDING IN THE URINARY TRACT, ONLY THE UNCHANGED 7-8 MM  HIGH ATTENUATION NONOBSTRUCTING LOWER POLE LEFT RENAL STONE, STILL  THE ONLY STONE IN THE ENTIRE URINARY TRACT      NO OBSTRUCTING/OFFENDING STONE. NO STONE OR STONE FRAGMENT IN THE  RIGHT KIDNEY, EITHER URETER OR THE URINARY BLADDER      NO HYDROURETERONEPHROSIS ON EITHER SIDE      NO EVIDENCE OF ACUTE INFLAMMATION ANYWHERE IN THE URINARY TRACT      NO SOLID RENAL PARENCHYMAL MASS      NO EVIDENCE OF UROTHELIAL LESION IN THE OPACIFIED URINARY TRACT,  NOTING THAT THE FOLLOWING WAS / WERE NOT WELL ENOUGH OPACIFIED WITH  EXCRETED CONTRAST DURING THE EXCRETORY PHASE OF TODAY'S EXAM TO  EVALUATE DIRECTLY: THE NONDEPENDENT ONE HALF OF THE URINARY BLADDER;  SCATTERED SHORT SEGMENTS OF THE RIGHT URETER; THE DISTAL 2/3  OF THE  LEFT URETER.  OF THE PORTIONS OF THE URETERS NOT WELL ENOUGH  OPACIFIED TO EVALUATE DIRECTLY, NONE WAS PARTICULARLY EXPANSILE TO  SUGGEST AND UNDERLYING SUBSTANTIAL SIZED OCCULT UROTHELIAL LESION      NO VARIANT ANATOMY SUCH AS URACHAL REMNANT OR DUPLICATED/ECTOPIC  URETERS      NO ACUTE UNANTICIPATED PROCESS IN THE ABDOMEN OR PELVIS OUTSIDE THE  URINARY TRACT      MACRO:  None        Objective     /76   Pulse 76   Temp 36.4 °C (97.5 °F) (Temporal)    Physical Exam  General: Appears comfortable and in no apparent distress, well nourished  Head: Normocephalic, atraumatic  Neck: trachea midline  Respiratory: respirations unlabored, no wheezes, and no use of accessory muscles  Cardiovascular: at rest no dyspnea, well perfused  Skin: no visible rashes or lesions  Neurologic: grossly intact, oriented to person, place, and time  Psychiatric: mood and affect appropriate  Musculoskeletal: in chair for appt. no difficulty w upper body movement    Assessment/Plan   Problem List Items Addressed This Visit    None  Visit Diagnoses       Microscopic hematuria    -  Primary    Relevant Orders    POCT UA Automated manually resulted (Completed)    Post-Void Residual (Completed)    Nephrolithiasis        Relevant Orders    Basic metabolic panel    Uric acid    Phosphorus    Magnesium          Orders Placed This Encounter   Procedures    Post-Void Residual    Basic metabolic panel     Standing Status:   Future     Number of Occurrences:   1     Standing Expiration Date:   4/15/2026     Order Specific Question:   Release result to [facility]     Answer:   Immediate [1]    Uric acid     Standing Status:   Future     Number of Occurrences:   1     Standing Expiration Date:   4/15/2026     Order Specific Question:   Release result to [facility]     Answer:   Immediate [1]    Phosphorus     Standing Status:   Future     Number of Occurrences:   1     Standing Expiration Date:   4/15/2026     Order Specific Question:   Release  result to 2U     Answer:   Immediate [1]    Magnesium     Standing Status:   Future     Number of Occurrences:   1     Standing Expiration Date:   4/15/2026     Order Specific Question:   Release result to 2U     Answer:   Immediate [1]    POCT UA Automated manually resulted     Order Specific Question:   Release result to 2U     Answer:   Immediate [1]      2 of the 24 hr collections two days in a row when you first do it, then 1 24 hr collection when you do f/u  serum blood work includes  calcium-BMP  creatinine-BMP  Chloride BMP  Potassium BMP  Sodium BMP  Uric Acid **  phosphorous **  Magnesium **  Carbon Dioxide **    hold off until 6 weeks after passing a stone or a stone surgery    will send you fedex label, call then number and      Follow up w Dr. Hunt June appt surgical consult stone  Follow up Rosaline end of May lithjuwank  Rosaline Cheema, APRN-CNP  Lab Results   Component Value Date    GLUCOSE 105 (H) 03/18/2025    CALCIUM 9.5 03/18/2025     03/18/2025    K 4.1 03/18/2025    CO2 31 03/18/2025     03/18/2025    BUN 21 03/18/2025    CREATININE 0.92 03/18/2025

## 2025-04-15 NOTE — Clinical Note
100% better since fulguration Dr. Gilbert Jimenez, [facility] I already put [facility] blood work in.   Thanks, Rosaline

## 2025-04-20 LAB
ALBUMIN SERPL-MCNC: 4.2 G/DL (ref 3.6–5.1)
ALP SERPL-CCNC: 80 U/L (ref 37–153)
ALT SERPL-CCNC: 21 U/L (ref 6–29)
ANION GAP SERPL CALCULATED.4IONS-SCNC: 9 MMOL/L (CALC) (ref 7–17)
APO B SERPL-MCNC: 140 MG/DL
AST SERPL-CCNC: 18 U/L (ref 10–35)
BILIRUB SERPL-MCNC: 0.6 MG/DL (ref 0.2–1.2)
BUN SERPL-MCNC: 17 MG/DL (ref 7–25)
CALCIUM SERPL-MCNC: 9.4 MG/DL (ref 8.6–10.4)
CHLORIDE SERPL-SCNC: 104 MMOL/L (ref 98–110)
CHOLEST SERPL-MCNC: 249 MG/DL
CHOLEST/HDLC SERPL: 5.9 (CALC)
CO2 SERPL-SCNC: 27 MMOL/L (ref 20–32)
CREAT SERPL-MCNC: 0.75 MG/DL (ref 0.5–1.05)
EGFRCR SERPLBLD CKD-EPI 2021: 89 ML/MIN/1.73M2
EST. AVERAGE GLUCOSE BLD GHB EST-MCNC: 128 MG/DL
EST. AVERAGE GLUCOSE BLD GHB EST-SCNC: 7.1 MMOL/L
GLUCOSE SERPL-MCNC: 96 MG/DL (ref 65–99)
HBA1C MFR BLD: 6.1 %
HDLC SERPL-MCNC: 42 MG/DL
LDLC SERPL CALC-MCNC: ABNORMAL MG/DL
LDLC SERPL DIRECT ASSAY-MCNC: 131 MG/DL
LPA SERPL-SCNC: <10 NMOL/L
NONHDLC SERPL-MCNC: 207 MG/DL (CALC)
POTASSIUM SERPL-SCNC: 4.2 MMOL/L (ref 3.5–5.3)
PROT SERPL-MCNC: 6.6 G/DL (ref 6.1–8.1)
SODIUM SERPL-SCNC: 140 MMOL/L (ref 135–146)
TRIGL SERPL-MCNC: 453 MG/DL
TSH SERPL-ACNC: 1.37 MIU/L (ref 0.4–4.5)

## 2025-05-06 ENCOUNTER — APPOINTMENT (OUTPATIENT)
Dept: UROLOGY | Facility: CLINIC | Age: 65
End: 2025-05-06
Payer: COMMERCIAL

## 2025-05-21 DIAGNOSIS — R10.13 DYSPEPSIA: ICD-10-CM

## 2025-05-21 DIAGNOSIS — K21.9 GASTROESOPHAGEAL REFLUX DISEASE, UNSPECIFIED WHETHER ESOPHAGITIS PRESENT: ICD-10-CM

## 2025-05-21 DIAGNOSIS — B00.1 COLD SORE: ICD-10-CM

## 2025-05-21 PROCEDURE — RXMED WILLOW AMBULATORY MEDICATION CHARGE

## 2025-05-21 RX ORDER — OMEPRAZOLE 40 MG/1
40 CAPSULE, DELAYED RELEASE ORAL DAILY
Qty: 90 CAPSULE | Refills: 0 | Status: SHIPPED | OUTPATIENT
Start: 2025-05-21 | End: 2026-05-21

## 2025-05-21 RX ORDER — VALACYCLOVIR HYDROCHLORIDE 1 G/1
1000 TABLET, FILM COATED ORAL DAILY
Qty: 90 TABLET | Refills: 0 | Status: SHIPPED | OUTPATIENT
Start: 2025-05-21 | End: 2026-05-21

## 2025-05-23 ENCOUNTER — PHARMACY VISIT (OUTPATIENT)
Dept: PHARMACY | Facility: CLINIC | Age: 65
End: 2025-05-23
Payer: COMMERCIAL

## 2025-05-27 ENCOUNTER — PHARMACY VISIT (OUTPATIENT)
Dept: PHARMACY | Facility: CLINIC | Age: 65
End: 2025-05-27
Payer: COMMERCIAL

## 2025-06-11 ENCOUNTER — APPOINTMENT (OUTPATIENT)
Dept: UROLOGY | Facility: CLINIC | Age: 65
End: 2025-06-11
Payer: COMMERCIAL

## 2025-06-25 NOTE — PROGRESS NOTES
Subjective   Patient ID: Shilpi Barraza is a 64 y.o. female     HPI  64 y.o. female presenting with recurrent UTIs, microscopic hematuria, pelvic pain and a known 7 mm Left lower pole kidney stone having undergone cystoscopy in office 3/13/25 followed by cystoscopy with biopsy and fulguration 3/25/25. Last seen in Office [3/13/25] . Last seen by Rosaline Cheema 4/15/25    The patient is doing well s/p biopsy and fulguration, she notes burning and dysuria has resolved. DTFx NTFx 2. She denies any flank pain or gross hematuria.     She presents today to discuss her left lower pole 7-8 mm stone.     She denies any vaginal complaints, no abnormal bleeding or discharge.   She denies any bowel related complaints, no fecal or flatal incontinence.  She has no other complaints.    Last Seen By Rosaline Cheema 4/15/25:  Shilpi Barraza is a 64 y.o. female who presents for follow up cystoscopy bladder biopsy, fulguration with Dr. Hunt on 3/25/25; Bladder ulcers noted on cystoscopy with Dr. Hunt on 3/13/25 noted with work up for microscopic hematuria; has failed Gemtesa, been on trospium.     No longer on OAB medicine, 100% better with bladder symptoms,no pain, no urgency, frequency or leakage; rare WINIFRED;     Discussed 7-8 mm stone, plans to follow up w Dr. Hunt to have managed. Retiring in May, will plan to do after that, maybe June. 24 hr urine collection discussed; would like to do the litholink evaluation 24 hr urine mid may, plan procedure w Dr. Hunt June/July for kidney stone;     UA large heme, small leuk today, 0 cc      PLAN:hold off until 6 weeks after passing a stone or a stone surgery    will send you fedex label, call then number and      Follow up w Dr. Hunt June appt surgical consult stone  Follow up [facility] end of May litholink      From Previous note 3/13/25  64 y.o.  patient presenting as a referral from Rosaline cheema with complaints of pelvic pain, microscopic hematuria and recurrent UTIs presenting for  cystoscopic evaluation today 6/25/2025    The patient presents with bothersome pelvic pain and recurrent UTIs. She had a sense of pressure when she sits down, her pain is more inside the vagina for the past 7 months. Pain increases with walking, the pain bothers her daily but it worse on someday's. She does not find relief, When her bladder is full she notes worsens her pain. At times it is cram py.  She notes 4-5 episodes of nocturia but denies any enuresis. She voids every hour during the day. She does leaking on laughing, cough or sneezing but note with urgency.   Her UTIs started in October 2024 : 2/24/2025 enterococcus faecalis, 2/5/2025 Klebsiella, 1/6/25 klebsiella + urine culture, 12/12/24 klebsiella + urine culture and 10/14/24 klebsiella + urine culture.  She has a known 7 mm kidney stone in the lower pole of her left kidney.   She is sexually active but denies any vaginal complaints, no abnormal vaginal bleeding or discharge. She denies any vaginal dryness or irritation. She denies any bulge complaints, no pressure or pulling.  She has no other complaints.     Last seen by jimenez alba:  Lexy Barraza is a 64 y.o. female who presents for follow up CT results, microscopic hematuria, UTIs, bladder pain;  med response Gemtesa.    Last visit 2/5/25 micro from straight cath, rbc > 60/hpf, wbc 20-40/hpf, treated for klebsiella + urine culture; 2/24/25 treated for enterococcus + urine culture by Toña Mariano PA-C  Taking the gemtesa but waking up hourly at night with pressure and pain in bladder; no gross hematuria; will trial trospium, sometimes hourly during the day, other times not as bad;   Scheduled for cystoscopy with Dr. Hunt on 3/13/25;  Dmanose concentrated cranberry supplement 2000 mg ([employer])  Estrogen vaginal cream     Urine culture sen today, scheduled for cystoscopy with Dr. Hunt on 3/13/25    Trospium 20 mg twice daily, can stop gemtesa if not helping    Discussed possible side effects: Prevent  constipation, prunes, stool softener, psyllium fiber, dry mouth Biotene if needed     Will make appt. With me after cystoscopy is completed, already scheduled for 3/13/25    Large kidney stone, can talk further w Dr. Hunt about procedure     Recap last visit 2/5/25  recent finding of kidney stone, noted on CT that was done d/t pelvic pain that began in October; urinary urgency, frequency and some UUI if she waits too long since October as well; urinating hourly since October, up 5 x at night, the pelvic pain low in her bladder; feels pressure to urinate, feels sometimes better after voiding, other times throbs;     No gross hematuria, has been treated for frequent UTIs lately; no vaginal bleeding;      Urine with large heme today;      UA large heme today, trace leuk  1/6/25 klebsiella + urine culture  12/12/24 klebsiella + urine culture  10/29/24 neg infection  10/14/24 klebsiella + urine culture  3/19/24 creatinine0.87, GFR 75     PVR 0 ml, today     12/27/24 CT abd/pel wo IV: KIDNEYS AND URETERS:  The kidneys are normal in size and unremarkable in appearance.  7 mm left inferior pole nonobstructing calculus without hydronephrosis. The urinary bladder is decompressed, limited for evaluation. No pelvic masses;     2/5/25 No vulvar lesions, neg Qtip tenderness, mod atrophy; Neg CST lying down, Neg levator ani tenderness or tightness  Stage I-II small cystocele, no rectocele or uterine prolapse; Non tender ovaries/uterus, difficult exam d/t  body habitus   No rectal exam done    Review of Systems  PHYSICAL EXAMINATION:  No LMP recorded. Patient is postmenopausal.  There is no height or weight on file to calculate BMI.  Visit Vitals  OB Status Postmenopausal   Smoking Status Never     General Appearance: well appearing  Neuro: Alert and oriented       Urine dip:   No results found for this or any previous visit (from the past 6 hours).    Imaging and results:  CT Urogram 2/12/25: IMPRESSION:  NO NEW ACUTE FINDING IN  THE URINARY TRACT, ONLY THE UNCHANGED 7-8 MM  HIGH ATTENUATION NONOBSTRUCTING LOWER POLE LEFT RENAL STONE, STILL  THE ONLY STONE IN THE ENTIRE URINARY TRACT      NO OBSTRUCTING/OFFENDING STONE. NO STONE OR STONE FRAGMENT IN THE  RIGHT KIDNEY, EITHER URETER OR THE URINARY BLADDER      NO HYDROURETERONEPHROSIS ON EITHER SIDE      NO EVIDENCE OF ACUTE INFLAMMATION ANYWHERE IN THE URINARY TRACT      NO SOLID RENAL PARENCHYMAL MASS      NO EVIDENCE OF UROTHELIAL LESION IN THE OPACIFIED URINARY TRACT,  NOTING THAT THE FOLLOWING WAS / WERE NOT WELL ENOUGH OPACIFIED WITH  EXCRETED CONTRAST DURING THE EXCRETORY PHASE OF TODAY'S EXAM TO  EVALUATE DIRECTLY: THE NONDEPENDENT ONE HALF OF THE URINARY BLADDER;  SCATTERED SHORT SEGMENTS OF THE RIGHT URETER; THE DISTAL 2/3 OF THE  LEFT URETER.  OF THE PORTIONS OF THE URETERS NOT WELL ENOUGH  OPACIFIED TO EVALUATE DIRECTLY, NONE WAS PARTICULARLY EXPANSILE TO  SUGGEST AND UNDERLYING SUBSTANTIAL SIZED OCCULT UROTHELIAL LESION      NO VARIANT ANATOMY SUCH AS URACHAL REMNANT OR DUPLICATED/ECTOPIC  URETERS      NO ACUTE UNANTICIPATED PROCESS IN THE ABDOMEN OR PELVIS OUTSIDE THE  URINARY TRACT      MACRO:  None\      FINAL DIAGNOSIS   A. 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 See note.     Note: Multiple deeper levels were examined. No definitive evidence of malignancy noted.           Assessment/Plan   Shilpi Barraza is a 64 y.o. female presenting with recurrent UTIs, microscopic hematuria, pelvic pain and a known 7 mm Left lower pole kidney stone having undergone cystoscopy in office 3/13/25 followed by cystoscopy with biopsy and fulguration 3/25/25.     I personally reviewed the patients imaging that showed a 7-8 mm left lower pole stone. Discussed left urethroscopy, laser lithotripsy and stone fragmentation with double J stent. Risks, benefits, and alternatives were explained.  Patient desires to proceed. Patient verbalizes understanding and has no other questions at this time. Surgery will be scheduled appropriately.      Scribe Attestation  By signing my name below, I, Margoth Juarez attest that this documentation has been prepared under the direction and in the presence of Jacky Hunt MD. All medical record entries made by the Scribe were at my direction or personally dictated by me. I have reviewed the chart and agree that the record accurately reflects my personal performance of the history, physical exam, discussion and plan.

## 2025-06-26 ENCOUNTER — APPOINTMENT (OUTPATIENT)
Dept: UROLOGY | Facility: CLINIC | Age: 65
End: 2025-06-26
Payer: COMMERCIAL

## 2025-06-26 VITALS — HEART RATE: 78 BPM | SYSTOLIC BLOOD PRESSURE: 132 MMHG | DIASTOLIC BLOOD PRESSURE: 74 MMHG | TEMPERATURE: 97.5 F

## 2025-06-26 DIAGNOSIS — N20.0 NEPHROLITHIASIS: ICD-10-CM

## 2025-06-26 LAB
POC APPEARANCE, URINE: CLEAR
POC BILIRUBIN, URINE: NEGATIVE
POC BLOOD, URINE: ABNORMAL
POC COLOR, URINE: YELLOW
POC GLUCOSE, URINE: NEGATIVE MG/DL
POC KETONES, URINE: NEGATIVE MG/DL
POC LEUKOCYTES, URINE: ABNORMAL
POC NITRITE,URINE: NEGATIVE
POC PH, URINE: 5.5 PH
POC PROTEIN, URINE: NEGATIVE MG/DL
POC SPECIFIC GRAVITY, URINE: <=1.005
POC UROBILINOGEN, URINE: 0.2 EU/DL

## 2025-06-26 PROCEDURE — 3078F DIAST BP <80 MM HG: CPT | Performed by: UROLOGY

## 2025-06-26 PROCEDURE — G2211 COMPLEX E/M VISIT ADD ON: HCPCS | Performed by: UROLOGY

## 2025-06-26 PROCEDURE — 81003 URINALYSIS AUTO W/O SCOPE: CPT | Performed by: UROLOGY

## 2025-06-26 PROCEDURE — 3075F SYST BP GE 130 - 139MM HG: CPT | Performed by: UROLOGY

## 2025-06-26 PROCEDURE — 99214 OFFICE O/P EST MOD 30 MIN: CPT | Performed by: UROLOGY

## 2025-07-03 ENCOUNTER — PREP FOR PROCEDURE (OUTPATIENT)
Dept: UROLOGY | Facility: CLINIC | Age: 65
End: 2025-07-03
Payer: MEDICARE

## 2025-07-03 ENCOUNTER — HOSPITAL ENCOUNTER (OUTPATIENT)
Facility: HOSPITAL | Age: 65
Setting detail: OUTPATIENT SURGERY
End: 2025-07-03
Attending: UROLOGY | Admitting: UROLOGY
Payer: MEDICARE

## 2025-07-03 DIAGNOSIS — N20.0 KIDNEY STONE ON LEFT SIDE: Primary | ICD-10-CM

## 2025-07-03 RX ORDER — CEFAZOLIN SODIUM 2 G/100ML
2 INJECTION, SOLUTION INTRAVENOUS ONCE
OUTPATIENT
Start: 2025-07-03 | End: 2025-07-03

## 2025-07-25 ENCOUNTER — TELEPHONE (OUTPATIENT)
Dept: UROLOGY | Facility: CLINIC | Age: 65
End: 2025-07-25
Payer: MEDICARE

## 2025-07-25 NOTE — TELEPHONE ENCOUNTER
Attempted to cantact patient regarding her procedure scheduled for Monday 9/8 with Dr. Hunt. HELENAM to inform patient information is also available on [employer] regarding this procedure.

## 2025-08-11 ENCOUNTER — APPOINTMENT (OUTPATIENT)
Dept: ORTHOPEDIC SURGERY | Facility: CLINIC | Age: 65
End: 2025-08-11
Payer: MEDICARE

## 2025-08-11 ENCOUNTER — HOSPITAL ENCOUNTER (OUTPATIENT)
Dept: RADIOLOGY | Facility: HOSPITAL | Age: 65
Discharge: HOME | End: 2025-08-11
Payer: MEDICARE

## 2025-08-11 DIAGNOSIS — M16.0 BILATERAL PRIMARY OSTEOARTHRITIS OF HIP: Primary | ICD-10-CM

## 2025-08-11 DIAGNOSIS — M25.552 BILATERAL HIP PAIN: ICD-10-CM

## 2025-08-11 DIAGNOSIS — M25.551 BILATERAL HIP PAIN: ICD-10-CM

## 2025-08-11 PROCEDURE — 99214 OFFICE O/P EST MOD 30 MIN: CPT | Performed by: ORTHOPAEDIC SURGERY

## 2025-08-11 PROCEDURE — 73521 X-RAY EXAM HIPS BI 2 VIEWS: CPT

## 2025-08-11 PROCEDURE — 1159F MED LIST DOCD IN RCRD: CPT | Performed by: ORTHOPAEDIC SURGERY

## 2025-08-11 PROCEDURE — RXMED WILLOW AMBULATORY MEDICATION CHARGE

## 2025-08-11 RX ORDER — MELOXICAM 15 MG/1
15 TABLET ORAL DAILY
Qty: 30 TABLET | Refills: 11 | Status: SHIPPED | OUTPATIENT
Start: 2025-08-11 | End: 2026-08-11

## 2025-08-14 ENCOUNTER — HOSPITAL ENCOUNTER (OUTPATIENT)
Dept: RADIOLOGY | Facility: EXTERNAL LOCATION | Age: 65
Discharge: HOME | End: 2025-08-14

## 2025-08-14 ENCOUNTER — OFFICE VISIT (OUTPATIENT)
Dept: ORTHOPEDIC SURGERY | Facility: CLINIC | Age: 65
End: 2025-08-14
Payer: MEDICARE

## 2025-08-14 ENCOUNTER — PHARMACY VISIT (OUTPATIENT)
Dept: PHARMACY | Facility: CLINIC | Age: 65
End: 2025-08-14
Payer: MEDICARE

## 2025-08-14 DIAGNOSIS — M16.11 ARTHRITIS OF RIGHT HIP: ICD-10-CM

## 2025-08-14 PROCEDURE — 99214 OFFICE O/P EST MOD 30 MIN: CPT | Performed by: STUDENT IN AN ORGANIZED HEALTH CARE EDUCATION/TRAINING PROGRAM

## 2025-08-14 PROCEDURE — 20611 DRAIN/INJ JOINT/BURSA W/US: CPT | Performed by: STUDENT IN AN ORGANIZED HEALTH CARE EDUCATION/TRAINING PROGRAM

## 2025-08-14 RX ORDER — BETAMETHASONE SODIUM PHOSPHATE AND BETAMETHASONE ACETATE 3; 3 MG/ML; MG/ML
18 INJECTION, SUSPENSION INTRA-ARTICULAR; INTRALESIONAL; INTRAMUSCULAR; SOFT TISSUE
Status: COMPLETED | OUTPATIENT
Start: 2025-08-14 | End: 2025-08-14

## 2025-08-14 RX ORDER — LIDOCAINE HYDROCHLORIDE 10 MG/ML
6 INJECTION, SOLUTION INFILTRATION; PERINEURAL
Status: COMPLETED | OUTPATIENT
Start: 2025-08-14 | End: 2025-08-14

## 2025-08-14 RX ADMIN — BETAMETHASONE SODIUM PHOSPHATE AND BETAMETHASONE ACETATE 18 MG: 3; 3 INJECTION, SUSPENSION INTRA-ARTICULAR; INTRALESIONAL; INTRAMUSCULAR; SOFT TISSUE at 09:20

## 2025-08-14 RX ADMIN — LIDOCAINE HYDROCHLORIDE 6 ML: 10 INJECTION, SOLUTION INFILTRATION; PERINEURAL at 09:20

## 2025-08-19 DIAGNOSIS — I10 PRIMARY HYPERTENSION: ICD-10-CM

## 2025-08-19 PROCEDURE — RXMED WILLOW AMBULATORY MEDICATION CHARGE

## 2025-08-19 RX ORDER — LOSARTAN POTASSIUM AND HYDROCHLOROTHIAZIDE 12.5; 1 MG/1; MG/1
1 TABLET ORAL DAILY
Qty: 90 TABLET | Refills: 1 | Status: SHIPPED | OUTPATIENT
Start: 2025-08-19 | End: 2026-02-15

## 2025-08-21 ENCOUNTER — PHARMACY VISIT (OUTPATIENT)
Dept: PHARMACY | Facility: CLINIC | Age: 65
End: 2025-08-21
Payer: MEDICARE

## 2025-08-25 ENCOUNTER — APPOINTMENT (OUTPATIENT)
Dept: PRIMARY CARE | Facility: CLINIC | Age: 65
End: 2025-08-25
Payer: COMMERCIAL

## 2025-08-25 ENCOUNTER — LAB (OUTPATIENT)
Dept: LAB | Facility: HOSPITAL | Age: 65
End: 2025-08-25
Payer: MEDICARE

## 2025-08-25 ENCOUNTER — PRE-ADMISSION TESTING (OUTPATIENT)
Dept: PREADMISSION TESTING | Facility: HOSPITAL | Age: 65
End: 2025-08-25
Payer: MEDICARE

## 2025-08-25 VITALS
OXYGEN SATURATION: 96 % | BODY MASS INDEX: 28.34 KG/M2 | SYSTOLIC BLOOD PRESSURE: 130 MMHG | DIASTOLIC BLOOD PRESSURE: 80 MMHG | HEIGHT: 64 IN | TEMPERATURE: 97.6 F | RESPIRATION RATE: 16 BRPM | HEART RATE: 77 BPM | WEIGHT: 166 LBS

## 2025-08-25 VITALS
WEIGHT: 169.09 LBS | RESPIRATION RATE: 18 BRPM | TEMPERATURE: 97.9 F | BODY MASS INDEX: 28.87 KG/M2 | HEART RATE: 72 BPM | DIASTOLIC BLOOD PRESSURE: 59 MMHG | HEIGHT: 64 IN | SYSTOLIC BLOOD PRESSURE: 129 MMHG | OXYGEN SATURATION: 98 %

## 2025-08-25 DIAGNOSIS — Z78.0 POSTMENOPAUSAL: ICD-10-CM

## 2025-08-25 DIAGNOSIS — I10 PRIMARY HYPERTENSION: ICD-10-CM

## 2025-08-25 DIAGNOSIS — Z13.820 SCREENING FOR OSTEOPOROSIS: ICD-10-CM

## 2025-08-25 DIAGNOSIS — E78.1 HYPERTRIGLYCERIDEMIA: ICD-10-CM

## 2025-08-25 DIAGNOSIS — R10.13 DYSPEPSIA: ICD-10-CM

## 2025-08-25 DIAGNOSIS — E78.5 HYPERLIPIDEMIA, UNSPECIFIED HYPERLIPIDEMIA TYPE: ICD-10-CM

## 2025-08-25 DIAGNOSIS — Z01.818 PREOP EXAMINATION: Primary | ICD-10-CM

## 2025-08-25 DIAGNOSIS — B00.1 COLD SORE: ICD-10-CM

## 2025-08-25 DIAGNOSIS — Z23 IMMUNIZATION DUE: ICD-10-CM

## 2025-08-25 DIAGNOSIS — Z71.89 ADVANCED DIRECTIVES, COUNSELING/DISCUSSION: ICD-10-CM

## 2025-08-25 DIAGNOSIS — K21.9 GASTROESOPHAGEAL REFLUX DISEASE, UNSPECIFIED WHETHER ESOPHAGITIS PRESENT: ICD-10-CM

## 2025-08-25 DIAGNOSIS — N20.0 KIDNEY STONE ON LEFT SIDE: ICD-10-CM

## 2025-08-25 DIAGNOSIS — Z12.31 ENCOUNTER FOR SCREENING MAMMOGRAM FOR MALIGNANT NEOPLASM OF BREAST: ICD-10-CM

## 2025-08-25 DIAGNOSIS — Z71.89 CARDIAC RISK COUNSELING: ICD-10-CM

## 2025-08-25 DIAGNOSIS — Z00.00 ANNUAL PHYSICAL EXAM: Primary | ICD-10-CM

## 2025-08-25 DIAGNOSIS — N20.0 CALCULUS OF KIDNEY: Primary | ICD-10-CM

## 2025-08-25 DIAGNOSIS — R73.03 PREDIABETES: ICD-10-CM

## 2025-08-25 LAB
ANION GAP SERPL CALC-SCNC: 12 MMOL/L (ref 10–20)
APPEARANCE UR: CLEAR
BILIRUB UR STRIP.AUTO-MCNC: NEGATIVE MG/DL
BUN SERPL-MCNC: 19 MG/DL (ref 6–23)
CALCIUM SERPL-MCNC: 9.2 MG/DL (ref 8.6–10.3)
CHLORIDE SERPL-SCNC: 103 MMOL/L (ref 98–107)
CO2 SERPL-SCNC: 28 MMOL/L (ref 21–32)
COLOR UR: ABNORMAL
CREAT SERPL-MCNC: 0.87 MG/DL (ref 0.5–1.05)
EGFRCR SERPLBLD CKD-EPI 2021: 74 ML/MIN/1.73M*2
ERYTHROCYTE [DISTWIDTH] IN BLOOD BY AUTOMATED COUNT: 12.8 % (ref 11.5–14.5)
GLUCOSE SERPL-MCNC: 113 MG/DL (ref 74–99)
GLUCOSE UR STRIP.AUTO-MCNC: NORMAL MG/DL
HCT VFR BLD AUTO: 37.9 % (ref 36–46)
HGB BLD-MCNC: 13.2 G/DL (ref 12–16)
KETONES UR STRIP.AUTO-MCNC: NEGATIVE MG/DL
LEUKOCYTE ESTERASE UR QL STRIP.AUTO: NEGATIVE
MCH RBC QN AUTO: 31.6 PG (ref 26–34)
MCHC RBC AUTO-ENTMCNC: 34.8 G/DL (ref 32–36)
MCV RBC AUTO: 91 FL (ref 80–100)
MUCOUS THREADS #/AREA URNS AUTO: NORMAL /LPF
NITRITE UR QL STRIP.AUTO: NEGATIVE
NRBC BLD-RTO: 0 /100 WBCS (ref 0–0)
PH UR STRIP.AUTO: 5 [PH]
PLATELET # BLD AUTO: 221 X10*3/UL (ref 150–450)
POC HEMOGLOBIN A1C: 6.2 % (ref 4.2–6.5)
POTASSIUM SERPL-SCNC: 3.8 MMOL/L (ref 3.5–5.3)
PROT UR STRIP.AUTO-MCNC: NEGATIVE MG/DL
RBC # BLD AUTO: 4.18 X10*6/UL (ref 4–5.2)
RBC # UR STRIP.AUTO: ABNORMAL MG/DL
RBC #/AREA URNS AUTO: NORMAL /HPF
SODIUM SERPL-SCNC: 139 MMOL/L (ref 136–145)
SP GR UR STRIP.AUTO: 1.02
SQUAMOUS #/AREA URNS AUTO: NORMAL /HPF
UROBILINOGEN UR STRIP.AUTO-MCNC: NORMAL MG/DL
WBC # BLD AUTO: 10.6 X10*3/UL (ref 4.4–11.3)
WBC #/AREA URNS AUTO: NORMAL /HPF

## 2025-08-25 PROCEDURE — 1123F ACP DISCUSS/DSCN MKR DOCD: CPT | Performed by: PHYSICIAN ASSISTANT

## 2025-08-25 PROCEDURE — G0446 INTENS BEHAVE THER CARDIO DX: HCPCS | Performed by: PHYSICIAN ASSISTANT

## 2025-08-25 PROCEDURE — 83036 HEMOGLOBIN GLYCOSYLATED A1C: CPT | Performed by: PHYSICIAN ASSISTANT

## 2025-08-25 PROCEDURE — 36415 COLL VENOUS BLD VENIPUNCTURE: CPT

## 2025-08-25 PROCEDURE — 81001 URINALYSIS AUTO W/SCOPE: CPT

## 2025-08-25 PROCEDURE — RXMED WILLOW AMBULATORY MEDICATION CHARGE

## 2025-08-25 PROCEDURE — 85027 COMPLETE CBC AUTOMATED: CPT

## 2025-08-25 PROCEDURE — 1158F ADVNC CARE PLAN TLK DOCD: CPT | Performed by: PHYSICIAN ASSISTANT

## 2025-08-25 PROCEDURE — 3008F BODY MASS INDEX DOCD: CPT | Performed by: PHYSICIAN ASSISTANT

## 2025-08-25 PROCEDURE — 1159F MED LIST DOCD IN RCRD: CPT | Performed by: PHYSICIAN ASSISTANT

## 2025-08-25 PROCEDURE — G0402 INITIAL PREVENTIVE EXAM: HCPCS | Performed by: PHYSICIAN ASSISTANT

## 2025-08-25 PROCEDURE — 1170F FXNL STATUS ASSESSED: CPT | Performed by: PHYSICIAN ASSISTANT

## 2025-08-25 PROCEDURE — 99203 OFFICE O/P NEW LOW 30 MIN: CPT

## 2025-08-25 PROCEDURE — 1160F RVW MEDS BY RX/DR IN RCRD: CPT | Performed by: PHYSICIAN ASSISTANT

## 2025-08-25 PROCEDURE — 80048 BASIC METABOLIC PNL TOTAL CA: CPT

## 2025-08-25 PROCEDURE — 93005 ELECTROCARDIOGRAM TRACING: CPT

## 2025-08-25 PROCEDURE — 1036F TOBACCO NON-USER: CPT | Performed by: PHYSICIAN ASSISTANT

## 2025-08-25 PROCEDURE — 3075F SYST BP GE 130 - 139MM HG: CPT | Performed by: PHYSICIAN ASSISTANT

## 2025-08-25 PROCEDURE — 3079F DIAST BP 80-89 MM HG: CPT | Performed by: PHYSICIAN ASSISTANT

## 2025-08-25 PROCEDURE — 99497 ADVNCD CARE PLAN 30 MIN: CPT | Performed by: PHYSICIAN ASSISTANT

## 2025-08-25 RX ORDER — FENOFIBRATE 145 MG/1
145 TABLET, FILM COATED ORAL DAILY
Qty: 90 TABLET | Refills: 3 | Status: SHIPPED | OUTPATIENT
Start: 2025-08-25 | End: 2026-08-20

## 2025-08-25 RX ORDER — LOSARTAN POTASSIUM AND HYDROCHLOROTHIAZIDE 12.5; 1 MG/1; MG/1
1 TABLET ORAL DAILY
Qty: 90 TABLET | Refills: 1 | Status: SHIPPED | OUTPATIENT
Start: 2025-08-25 | End: 2025-08-25 | Stop reason: SINTOL

## 2025-08-25 RX ORDER — OMEPRAZOLE 40 MG/1
40 CAPSULE, DELAYED RELEASE ORAL DAILY
Qty: 90 CAPSULE | Refills: 1 | Status: SHIPPED | OUTPATIENT
Start: 2025-08-25 | End: 2026-08-25

## 2025-08-25 RX ORDER — FENOFIBRATE 145 MG/1
145 TABLET, FILM COATED ORAL DAILY
Qty: 30 TABLET | Refills: 5 | Status: SHIPPED | OUTPATIENT
Start: 2025-08-25 | End: 2025-08-25 | Stop reason: SDUPTHER

## 2025-08-25 RX ORDER — LOSARTAN POTASSIUM 100 MG/1
100 TABLET ORAL DAILY
Qty: 90 TABLET | Refills: 3 | Status: SHIPPED | OUTPATIENT
Start: 2025-08-25 | End: 2026-08-20

## 2025-08-25 RX ORDER — VALACYCLOVIR HYDROCHLORIDE 1 G/1
1000 TABLET, FILM COATED ORAL DAILY
Qty: 90 TABLET | Refills: 1 | Status: SHIPPED | OUTPATIENT
Start: 2025-08-25 | End: 2026-08-25

## 2025-08-25 SDOH — ECONOMIC STABILITY: TRANSPORTATION INSECURITY
IN THE PAST 12 MONTHS, HAS LACK OF TRANSPORTATION KEPT YOU FROM MEETINGS, WORK, OR FROM GETTING THINGS NEEDED FOR DAILY LIVING?: NO

## 2025-08-25 SDOH — ECONOMIC STABILITY: FOOD INSECURITY: WITHIN THE PAST 12 MONTHS, THE FOOD YOU BOUGHT JUST DIDN'T LAST AND YOU DIDN'T HAVE MONEY TO GET MORE.: NEVER TRUE

## 2025-08-25 SDOH — ECONOMIC STABILITY: FOOD INSECURITY: WITHIN THE PAST 12 MONTHS, YOU WORRIED THAT YOUR FOOD WOULD RUN OUT BEFORE YOU GOT MONEY TO BUY MORE.: NEVER TRUE

## 2025-08-25 SDOH — ECONOMIC STABILITY: INCOME INSECURITY: IN THE LAST 12 MONTHS, WAS THERE A TIME WHEN YOU WERE NOT ABLE TO PAY THE MORTGAGE OR RENT ON TIME?: NO

## 2025-08-25 SDOH — ECONOMIC STABILITY: TRANSPORTATION INSECURITY
IN THE PAST 12 MONTHS, HAS THE LACK OF TRANSPORTATION KEPT YOU FROM MEDICAL APPOINTMENTS OR FROM GETTING MEDICATIONS?: NO

## 2025-08-25 ASSESSMENT — LIFESTYLE VARIABLES: SMOKING_STATUS: NONSMOKER

## 2025-08-25 ASSESSMENT — PATIENT HEALTH QUESTIONNAIRE - PHQ9
2. FEELING DOWN, DEPRESSED OR HOPELESS: NOT AT ALL
1. LITTLE INTEREST OR PLEASURE IN DOING THINGS: NOT AT ALL
SUM OF ALL RESPONSES TO PHQ9 QUESTIONS 1 AND 2: 0

## 2025-08-25 ASSESSMENT — DUKE ACTIVITY SCORE INDEX (DASI)
CAN YOU CLIMB A FLIGHT OF STAIRS OR WALK UP A HILL: YES
CAN YOU DO LIGHT WORK AROUND THE HOUSE LIKE DUSTING OR WASHING DISHES: YES
CAN YOU PARTICIPATE IN STRENOUS SPORTS LIKE SWIMMING, SINGLES TENNIS, FOOTBALL, BASKETBALL, OR SKIING: NO
CAN YOU WALK INDOORS, SUCH AS AROUND YOUR HOUSE: YES
DASI METS SCORE: 9
TOTAL_SCORE: 50.7
CAN YOU DO HEAVY WORK AROUND THE HOUSE LIKE SCRUBBING FLOORS OR LIFTING AND MOVING HEAVY FURNITURE: YES
CAN YOU TAKE CARE OF YOURSELF (EAT, DRESS, BATHE, OR USE TOILET): YES
CAN YOU WALK A BLOCK OR TWO ON LEVEL GROUND: YES
CAN YOU PARTICIPATE IN MODERATE RECREATIONAL ACTIVITIES LIKE GOLF, BOWLING, DANCING, DOUBLES TENNIS OR THROWING A BASEBALL OR FOOTBALL: YES
CAN YOU HAVE SEXUAL RELATIONS: YES
CAN YOU DO YARD WORK LIKE RAKING LEAVES, WEEDING OR PUSHING A MOWER: YES
CAN YOU DO MODERATE WORK AROUND THE HOUSE LIKE VACUUMING, SWEEPING FLOORS OR CARRYING GROCERIES: YES
CAN YOU RUN A SHORT DISTANCE: YES

## 2025-08-25 ASSESSMENT — ACTIVITIES OF DAILY LIVING (ADL)
TAKING_MEDICATION: INDEPENDENT
MANAGING_FINANCES: INDEPENDENT
DRESSING: INDEPENDENT
ADL_SCORE: 0
BATHING: INDEPENDENT
GROCERY_SHOPPING: INDEPENDENT
DOING_HOUSEWORK: INDEPENDENT
ADL_SCORE: 0

## 2025-08-25 ASSESSMENT — PAIN - FUNCTIONAL ASSESSMENT
PAIN_FUNCTIONAL_ASSESSMENT: 0-10
PAIN_FUNCTIONAL_ASSESSMENT: 0-10

## 2025-08-25 ASSESSMENT — PAIN SCALES - GENERAL
PAINLEVEL_OUTOF10: 0 - NO PAIN
PAINLEVEL_OUTOF10: 0 - NO PAIN

## 2025-08-26 LAB
ATRIAL RATE: 72 BPM
HOLD SPECIMEN: NORMAL
P AXIS: 25 DEGREES
P OFFSET: 199 MS
P ONSET: 156 MS
PR INTERVAL: 140 MS
Q ONSET: 226 MS
QRS COUNT: 12 BEATS
QRS DURATION: 76 MS
QT INTERVAL: 380 MS
QTC CALCULATION(BAZETT): 416 MS
QTC FREDERICIA: 404 MS
R AXIS: -11 DEGREES
T AXIS: 41 DEGREES
T OFFSET: 416 MS
VENTRICULAR RATE: 72 BPM

## 2025-08-27 ENCOUNTER — APPOINTMENT (OUTPATIENT)
Dept: ORTHOPEDIC SURGERY | Facility: CLINIC | Age: 65
End: 2025-08-27
Payer: MEDICARE

## 2025-08-28 ENCOUNTER — PHARMACY VISIT (OUTPATIENT)
Dept: PHARMACY | Facility: CLINIC | Age: 65
End: 2025-08-28
Payer: MEDICARE

## 2025-09-11 ENCOUNTER — APPOINTMENT (OUTPATIENT)
Dept: UROLOGY | Facility: CLINIC | Age: 65
End: 2025-09-11
Payer: MEDICARE

## 2025-11-11 ENCOUNTER — APPOINTMENT (OUTPATIENT)
Dept: ORTHOPEDIC SURGERY | Facility: CLINIC | Age: 65
End: 2025-11-11
Payer: MEDICARE

## 2026-02-25 ENCOUNTER — APPOINTMENT (OUTPATIENT)
Dept: PRIMARY CARE | Facility: CLINIC | Age: 66
End: 2026-02-25
Payer: MEDICARE

## (undated) DEVICE — SYRINGE, LUER LOCK, 12ML

## (undated) DEVICE — ELECTRODE, HF, ESG PLASMA LOOP-MEDIUM 30 DEG

## (undated) DEVICE — Device

## (undated) DEVICE — TOWEL PACK, STERILE, 4/PACK, BLUE

## (undated) DEVICE — SOLUTION, IRRIGATION, STERILE WATER, 1000 ML, POUR BOTTLE

## (undated) DEVICE — SOLUTION, IRRIGATION, SODIUM CHLORIDE 0.9%, 1000 ML, POUR BOTTLE

## (undated) DEVICE — GLOVE, SURGICAL, PROTEXIS PI ORTHO, 7.5, PF, LF